# Patient Record
Sex: MALE | Race: WHITE | NOT HISPANIC OR LATINO | URBAN - METROPOLITAN AREA
[De-identification: names, ages, dates, MRNs, and addresses within clinical notes are randomized per-mention and may not be internally consistent; named-entity substitution may affect disease eponyms.]

---

## 2021-02-07 ENCOUNTER — INPATIENT (INPATIENT)
Facility: HOSPITAL | Age: 81
LOS: 1 days | Discharge: HOME | End: 2021-02-09
Attending: INTERNAL MEDICINE | Admitting: INTERNAL MEDICINE
Payer: MEDICARE

## 2021-02-07 VITALS
TEMPERATURE: 98 F | OXYGEN SATURATION: 100 % | HEART RATE: 59 BPM | RESPIRATION RATE: 18 BRPM | DIASTOLIC BLOOD PRESSURE: 64 MMHG | SYSTOLIC BLOOD PRESSURE: 141 MMHG

## 2021-02-07 DIAGNOSIS — Z98.890 OTHER SPECIFIED POSTPROCEDURAL STATES: Chronic | ICD-10-CM

## 2021-02-07 LAB
ALBUMIN SERPL ELPH-MCNC: 4 G/DL — SIGNIFICANT CHANGE UP (ref 3.5–5.2)
ALP SERPL-CCNC: 41 U/L — SIGNIFICANT CHANGE UP (ref 30–115)
ALT FLD-CCNC: 11 U/L — SIGNIFICANT CHANGE UP (ref 0–41)
ANION GAP SERPL CALC-SCNC: 17 MMOL/L — HIGH (ref 7–14)
AST SERPL-CCNC: 24 U/L — SIGNIFICANT CHANGE UP (ref 0–41)
BASOPHILS # BLD AUTO: 0.09 K/UL — SIGNIFICANT CHANGE UP (ref 0–0.2)
BASOPHILS NFR BLD AUTO: 0.8 % — SIGNIFICANT CHANGE UP (ref 0–1)
BILIRUB SERPL-MCNC: 0.2 MG/DL — SIGNIFICANT CHANGE UP (ref 0.2–1.2)
BUN SERPL-MCNC: 20 MG/DL — SIGNIFICANT CHANGE UP (ref 10–20)
CALCIUM SERPL-MCNC: 9.3 MG/DL — SIGNIFICANT CHANGE UP (ref 8.5–10.1)
CHLORIDE SERPL-SCNC: 93 MMOL/L — LOW (ref 98–110)
CO2 SERPL-SCNC: 15 MMOL/L — LOW (ref 17–32)
CREAT SERPL-MCNC: 1.5 MG/DL — SIGNIFICANT CHANGE UP (ref 0.7–1.5)
EOSINOPHIL # BLD AUTO: 0.13 K/UL — SIGNIFICANT CHANGE UP (ref 0–0.7)
EOSINOPHIL NFR BLD AUTO: 1.2 % — SIGNIFICANT CHANGE UP (ref 0–8)
ETHANOL SERPL-MCNC: <10 MG/DL — SIGNIFICANT CHANGE UP
GLUCOSE SERPL-MCNC: 110 MG/DL — HIGH (ref 70–99)
HCT VFR BLD CALC: 33.6 % — LOW (ref 42–52)
HGB BLD-MCNC: 11.5 G/DL — LOW (ref 14–18)
IMM GRANULOCYTES NFR BLD AUTO: 2.5 % — HIGH (ref 0.1–0.3)
LYMPHOCYTES # BLD AUTO: 1.93 K/UL — SIGNIFICANT CHANGE UP (ref 1.2–3.4)
LYMPHOCYTES # BLD AUTO: 17.6 % — LOW (ref 20.5–51.1)
MAGNESIUM SERPL-MCNC: 1.9 MG/DL — SIGNIFICANT CHANGE UP (ref 1.8–2.4)
MCHC RBC-ENTMCNC: 30.7 PG — SIGNIFICANT CHANGE UP (ref 27–31)
MCHC RBC-ENTMCNC: 34.2 G/DL — SIGNIFICANT CHANGE UP (ref 32–37)
MCV RBC AUTO: 89.8 FL — SIGNIFICANT CHANGE UP (ref 80–94)
MONOCYTES # BLD AUTO: 0.89 K/UL — HIGH (ref 0.1–0.6)
MONOCYTES NFR BLD AUTO: 8.1 % — SIGNIFICANT CHANGE UP (ref 1.7–9.3)
NEUTROPHILS # BLD AUTO: 7.66 K/UL — HIGH (ref 1.4–6.5)
NEUTROPHILS NFR BLD AUTO: 69.8 % — SIGNIFICANT CHANGE UP (ref 42.2–75.2)
NRBC # BLD: 0 /100 WBCS — SIGNIFICANT CHANGE UP (ref 0–0)
NT-PROBNP SERPL-SCNC: 421 PG/ML — HIGH (ref 0–300)
PLATELET # BLD AUTO: 222 K/UL — SIGNIFICANT CHANGE UP (ref 130–400)
POTASSIUM SERPL-MCNC: 4.8 MMOL/L — SIGNIFICANT CHANGE UP (ref 3.5–5)
POTASSIUM SERPL-SCNC: 4.8 MMOL/L — SIGNIFICANT CHANGE UP (ref 3.5–5)
PROT SERPL-MCNC: 6.9 G/DL — SIGNIFICANT CHANGE UP (ref 6–8)
RAPID RVP RESULT: SIGNIFICANT CHANGE UP
RBC # BLD: 3.74 M/UL — LOW (ref 4.7–6.1)
RBC # FLD: 13.8 % — SIGNIFICANT CHANGE UP (ref 11.5–14.5)
SARS-COV-2 RNA SPEC QL NAA+PROBE: SIGNIFICANT CHANGE UP
SODIUM SERPL-SCNC: 125 MMOL/L — LOW (ref 135–146)
TROPONIN T SERPL-MCNC: <0.01 NG/ML — SIGNIFICANT CHANGE UP
WBC # BLD: 10.97 K/UL — HIGH (ref 4.8–10.8)
WBC # FLD AUTO: 10.97 K/UL — HIGH (ref 4.8–10.8)

## 2021-02-07 PROCEDURE — 99285 EMERGENCY DEPT VISIT HI MDM: CPT

## 2021-02-07 PROCEDURE — 93010 ELECTROCARDIOGRAM REPORT: CPT

## 2021-02-07 PROCEDURE — 71045 X-RAY EXAM CHEST 1 VIEW: CPT | Mod: 26

## 2021-02-07 RX ORDER — TAMSULOSIN HYDROCHLORIDE 0.4 MG/1
0.8 CAPSULE ORAL AT BEDTIME
Refills: 0 | Status: DISCONTINUED | OUTPATIENT
Start: 2021-02-07 | End: 2021-02-09

## 2021-02-07 RX ORDER — CHLORHEXIDINE GLUCONATE 213 G/1000ML
1 SOLUTION TOPICAL
Refills: 0 | Status: DISCONTINUED | OUTPATIENT
Start: 2021-02-07 | End: 2021-02-09

## 2021-02-07 RX ORDER — ASPIRIN/CALCIUM CARB/MAGNESIUM 324 MG
81 TABLET ORAL
Refills: 0 | Status: DISCONTINUED | OUTPATIENT
Start: 2021-02-07 | End: 2021-02-09

## 2021-02-07 RX ORDER — ASPIRIN/CALCIUM CARB/MAGNESIUM 324 MG
1 TABLET ORAL
Qty: 0 | Refills: 0 | DISCHARGE

## 2021-02-07 RX ORDER — TAMSULOSIN HYDROCHLORIDE 0.4 MG/1
2 CAPSULE ORAL
Qty: 0 | Refills: 0 | DISCHARGE

## 2021-02-07 RX ORDER — FINASTERIDE 5 MG/1
1 TABLET, FILM COATED ORAL
Qty: 0 | Refills: 0 | DISCHARGE

## 2021-02-07 RX ORDER — HEPARIN SODIUM 5000 [USP'U]/ML
5000 INJECTION INTRAVENOUS; SUBCUTANEOUS EVERY 8 HOURS
Refills: 0 | Status: DISCONTINUED | OUTPATIENT
Start: 2021-02-07 | End: 2021-02-09

## 2021-02-07 NOTE — H&P ADULT - ASSESSMENT
80 yold male to ED Luxembourger speaking Pmhx BPH s/p prostate sx, HTN,  HLD, (Patient describes Endarterectomy procedure on left), depression s/p near syncopal episode witnessed by family; pt at dinner with family admits to feeling lightheaded and gradually laid to floor; pt has 2nd episode few minutes later upon  ambulation. Patient  states that he did not experience  any  room spinning only felt lightheaded at the  onset of the episode.  Patient  denies chest pain, sob,n/v/headache, sob, fever, chills cough; pt denies hx irreg hr in past; admits to mild alcohol use tonight;     Please Confirm rest  of medications in The AM: Apologize for the inconvenience   Family could not  remember all of patient medication  and Patient was also unable to remember  Vytorin  and Divalproex      Pharmacy: [facility] [address] [place] [address] Phone [phone]     Partial  Med Rec done       IMPRESSION  Presyncopal Episodes Likely Cardiogenic  Less likely  Autonomic dysfunction/ Peripheral Vertigo/Seizure/  Possible DOMENICO consult Progression on CKD Unknown  Baseline   HAGMA   Hyponatremia   Microcytic  Anemia   HO Endarterectomy? On left   HO HTN   HO BPH s/p Prostate Surg TURP?        Plan  Presyncopal Episodes Likely Cardiogenic  Less likely  Autonomic dysfunction/ Peripheral Vertigo   Tele monitoring  f/u Echo  f/u B12/folate/TSH  F/u Carotid Doppler   orthostatics  BP check    ECG  showing sinus  cindy  that improved  1st degree AV Block   holding BB     Possible DOMENICO consult Progression on CKD Unknown  Baseline   HAGMA   Hyponatremia? 125  - f/u urine lytes osmo, serum osmo  - monitor BMP   - Consider renal  US  if worsening  given Hx     HO Endarterectomy? On left  - f/u  carotid doppler   -  continue  Aspirin 81 BID     HO HTN   - monitor BP   - confirm home medications    HO BPH s/p Prostate Surg TURP?  - patient  takes  finasteride  and flowmax  - wears  adult diapers       Electrolyte Imbalances: Correct as needed  []  Hyponatremia   /   Hypernatremia  []   []  Hypokalemia   /   Hyperkalemia  []   []  Hypochlorhydria   /    Hypochlorhydria  []   []  Hypomagnesemia   /   Hypermagnesemia  []   []  Hypophosphatemia   /   Hyperphosphatemia  []       GI ppx:                                   [] Not indicated   /   [] Pantoprazole 40mg PO Daily    DVT ppx:  [] Not indicated / [x] Heparin 5000mg SubQ / [] Lovenox 40mg SubQ / [] SCDs    Fluids:   [x] PO  |  [] IVF    Activity:  [X] Assisatnce needed   [X] Increase as Tolerated  /  [] OOB w/ assist  /  [] Bed Rest    BMI:      DISPO:  Patient to be discharged when condition(s) optimized.  [x ] From Home     [ ] NH/SNF   [ ] 4A Rehab  [ ] Detox Clinic  [X] Plan Discussion with patient and/or family.  [X] Discussed Case and Plan with the Medical Attending.    CODE STATUS  [X] FULL   /    [] DNR     80 yold male to ED Kyrgyz speaking Pmhx BPH s/p prostate sx, HTN,  HLD, (Patient describes Endarterectomy procedure on left), depression s/p near syncopal episode witnessed by family; pt at dinner with family admits to feeling lightheaded and gradually laid to floor; pt has 2nd episode few minutes later upon  ambulation. Patient  states that he did not experience  any  room spinning only felt lightheaded at the  onset of the episode.  Patient  denies chest pain, sob,n/v/headache, sob, fever, chills cough; pt denies hx irreg hr in past; admits to mild alcohol use tonight;     Elevated  WBC Chest X-Ray  showing right lobe opacity no prior films     Please Confirm rest  of medications in The AM: Apologize for the inconvenience   Family could not  remember all of patient medication  and Patient was also unable to remember  Vytorin  and Divalproex      Pharmacy: [facility] [address] [place] [address] Phone [phone]     Partial  Med Rec done           IMPRESSION  Presyncopal Episodes Likely Cardiogenic  Less likely  Autonomic dysfunction/ Peripheral Vertigo/Seizure/  Possible DOMENICO consult Progression on CKD Unknown  Baseline   HAGMA   Hyponatremia   Microcytic  Anemia   HO Endarterectomy? On left   HO HTN   HO BPH s/p Prostate Surg TURP?        Plan  Presyncopal Episodes Likely Cardiogenic  Less likely  Autonomic dysfunction/ Peripheral Vertigo   Tele monitoring  f/u Echo  f/u B12/folate/TSH  F/u Carotid Doppler   orthostatics  BP check    ECG  showing sinus  cindy  that improved  1st degree AV Block   holding BB     Possible DOMENICO consult Progression on CKD Unknown  Baseline   HAGMA   Hyponatremia? 125  - f/u urine lytes osmo, serum osmo  - monitor BMP   - Consider renal  US  if worsening  given Hx     HO Endarterectomy? On left  - f/u  carotid doppler   -  continue  Aspirin 81 BID     HO HTN   - monitor BP   - confirm home medications    HO BPH s/p Prostate Surg TURP?  - patient  takes  finasteride  and flowmax  - wears  adult diapers       Electrolyte Imbalances: Correct as needed  []  Hyponatremia   /   Hypernatremia  []   []  Hypokalemia   /   Hyperkalemia  []   []  Hypochlorhydria   /    Hypochlorhydria  []   []  Hypomagnesemia   /   Hypermagnesemia  []   []  Hypophosphatemia   /   Hyperphosphatemia  []       GI ppx:                                   [] Not indicated   /   [] Pantoprazole 40mg PO Daily    DVT ppx:  [] Not indicated / [x] Heparin 5000mg SubQ / [] Lovenox 40mg SubQ / [] SCDs    Fluids:   [x] PO  |  [] IVF    Activity:  [X] Assisatnce needed   [X] Increase as Tolerated  /  [] OOB w/ assist  /  [] Bed Rest    BMI:      DISPO:  Patient to be discharged when condition(s) optimized.  [x ] From Home     [ ] NH/SNF   [ ] 4A Rehab  [ ] Detox Clinic  [X] Plan Discussion with patient and/or family.  [X] Discussed Case and Plan with the Medical Attending.    CODE STATUS  [X] FULL   /    [] DNR

## 2021-02-07 NOTE — H&P ADULT - HISTORY OF PRESENT ILLNESS
Guatemalan  [id number]  80 yold male to ED Singaporean speaking Pmhx BPH s/p prostate sx, HTN,  HLD, (Patient describes Endarterectomy procedure on left), depression s/p near syncopal episode witnessed by family; pt at dinner with family admits to feeling lightheaded and gradually laid to floor; pt has 2nd episode few minutes later upon  ambulation. Patient  states that he did not experience  any  room spinning only felt lightheaded at the  onset of the episode.  Patient  denies chest pain, sob,n/v/headache, sob, fever, chills cough; pt denies hx irreg hr in past; admits to mild alcohol use tonight;     Please Confirm rest  of medications in The AM Apologize for the inconvenience   Family could not  remember all of patient medication  and Patient was also unable to remember  Vytorin  and Divalproex      Pharmacy: [facility] [address] [place] [address] Phone [phone]     Partial  Med Rec done

## 2021-02-07 NOTE — ED PROVIDER NOTE - NS ED ROS FT
Constitutional: No fever, chills.  Eyes: No visual changes.  ENT: No hearing changes.  Neck: No neck pain or stiffness.  Cardiovascular: No chest pain, palpitations, edema.  Pulmonary: No SOB, cough. No hemoptysis.  Abdominal:  No nausea, vomiting, diarrhea.  : No dysuria, frequency.  Neuro: + near syncope x 2  MS: No back pain. No calf pain/swelling.  Psych: No suicidal ideations.

## 2021-02-07 NOTE — ED PROVIDER NOTE - PROGRESS NOTE DETAILS
pt initially placed in obs but will need further eval by renal, judicious fluid management for hyponatremia/pulmonary congestion; will admit to tele.

## 2021-02-07 NOTE — ED PROVIDER NOTE - OBJECTIVE STATEMENT
80 yold male to ED Congolese speaking Dr. Duckworth intrep Pmhx BPH s/p prostate sx, Htn, HLD, depression s/p near syncopal episode witnessed by family; pt at dinner with family admits to feeling lightheaded and gradually laid to floor; pt has 2nd episode few minutes later upon being ambulated; pt denies chest pain, sob,n/v/headache, sob, fever, chills cough; pt denies hx irreg hr in past; admits to mild alcohol use tonight;

## 2021-02-07 NOTE — H&P ADULT - NSHPREVIEWOFSYSTEMS_GEN_ALL_CORE
CONSTITUTIONAL: No fever, weight loss, or fatigue  EYES: No eye pain, visual disturbances, or discharge  ENMT:  No difficulty hearing, tinnitus, vertigo; No sinus or throat pain  RESPIRATORY: No cough, wheezing, chills or hemoptysis; No shortness of breath  CARDIOVASCULAR: No chest pain, palpitations, dizziness, or leg swelling  GASTROINTESTINAL: No abdominal or epigastric pain. No nausea, vomiting, or hematemesis; No diarrhea or constipation. No melena or hematochezia.  GENITOURINARY: No dysuria, frequency, hematuria, or incontinence  NEUROLOGICAL: No headaches, memory loss, loss of strength, numbness, or tremors  SKIN: No itching, burning, rashes, or lesions   LYMPH NODES: No enlarged glands  ENDOCRINE: No heat or cold intolerance; No hair loss

## 2021-02-07 NOTE — H&P ADULT - NSHPLABSRESULTS_GEN_ALL_CORE
.  LABS:                         11.5   10.97 )-----------( 222      ( 07 Feb 2021 19:55 )             33.6     02-07    125<L>  |  93<L>  |  20  ----------------------------<  110<H>  4.8   |  15<L>  |  1.5    Ca    9.3      07 Feb 2021 19:55  Mg     1.9     02-07    TPro  6.9  /  Alb  4.0  /  TBili  0.2  /  DBili  x   /  AST  24  /  ALT  11  /  AlkPhos  41  02-07        Serum Pro-Brain Natriuretic Peptide: 421 pg/mL (02-07 @ 19:55)    CARDIAC MARKERS ( 07 Feb 2021 19:55 )  x     / <0.01 ng/mL / x     / x     / x        ECG showing Sinus Adebayo Cardia         RADIOLOGY, EKG & ADDITIONAL TESTS: Reviewed.

## 2021-02-07 NOTE — ED PROVIDER NOTE - PHYSICAL EXAMINATION
Constitutional: Well developed, well nourished. NAD  Head: Normocephalic, atraumatic.  Eyes: PERRL, EOMI.  ENT: No nasal discharge. Mucous membranes dry.  Neck: Supple. Painless ROM.  Cardiovascular: Normal S1, S2. Regular rate and rhythm.    Pulmonary:  Lungs clear to auscultation bilaterally.    Abdominal: Soft. Nondistended. No rebound, guarding, rigidity.  Extremities. Pelvis stable. No lower extremity edema, symmetric calves.  Skin: No rashes, cyanosis.  Neuro: AAOx3. No focal neurological deficits.  Psych: Normal mood. Normal affect.

## 2021-02-07 NOTE — H&P ADULT - NSHPPHYSICALEXAM_GEN_ALL_CORE
General: No acute distress.  Alert, oriented, interactive, nonfocal.  Male     HEENT: Pupils equal, reactive to light symmetrically.    CHEST: gynecomastia      PULM: Clear to auscultation bilaterally, no significant sputum production.    CVS: Regular rate and rhythm, no murmurs, rubs, or gallops.    GI: Soft, nondistended, nontender, no masses.    EXT: No edema, nontender.    SKIN: Warm and well perfused, no rashes noted.    PSYCH: AAOx3    NEURO: Nonfocal

## 2021-02-07 NOTE — H&P ADULT - ATTENDING COMMENTS
HPI:  Cuban  [id number]  80 yold male to ED Marshallese speaking Pmhx BPH s/p prostate sx, HTN,  HLD, (Patient describes Endarterectomy procedure on left), depression s/p near syncopal episode witnessed by family; pt at dinner with family admits to feeling lightheaded and gradually laid to floor; pt has 2nd episode few minutes later upon  ambulation. Patient  states that he did not experience  any  room spinning only felt lightheaded at the  onset of the episode.  Patient  denies chest pain, sob,n/v/headache, sob, fever, chills cough; pt denies hx irreg hr in past; admits to mild alcohol use tonight;     Please Confirm rest  of medications in The AM Apologize for the inconvenience   Family could not  remember all of patient medication  and Patient was also unable to remember  Vytorin  and Divalproex      Pharmacy: [facility] [address] [place] [address] Phone [phone]     Partial  Med Rec done  (07 Feb 2021 23:26)    REVIEW OF SYSTEMS: see cc/HPI  CONSTITUTIONAL: No weakness, fevers or chills  EYES/ENT: No visual changes;  No vertigo or throat pain   NECK: No pain or stiffness  RESPIRATORY: No cough, wheezing, hemoptysis; No shortness of breath  CARDIOVASCULAR: No chest pain or palpitations  GASTROINTESTINAL: No abdominal or epigastric pain. No nausea, vomiting, or hematemesis; No diarrhea or constipation. No melena or hematochezia.  GENITOURINARY: No dysuria, frequency or hematuria  NEUROLOGICAL: No numbness or weakness  SKIN: No itching, rashes    Physical Exam:  General: WN/WD NAD  Neurology: A&Ox3, nonfocal, follows commands  Eyes: PERRLA/ EOMI  ENT/Neck: Neck supple, trachea midline, No JVD  Respiratory: CTA B/L, No wheezing, rales, rhonchi  CV: Normal rate regular rhythm, S1S2, no murmurs, rubs or gallops  Abdominal: Soft, NT, ND +BS,   Extremities: No edema, + peripheral pulses  Skin: No Rashes, Hematoma, Ecchymosis  Incisions: n/a  Tubes: n/a HPI:  Bermudian  [id number]  80 yold male to ED Andorran speaking Pmhx BPH s/p prostate sx, HTN,  HLD, (Patient describes Endarterectomy procedure on left), depression s/p near syncopal episode witnessed by family; pt at dinner with family admits to feeling lightheaded and gradually laid to floor; pt has 2nd episode few minutes later upon  ambulation. Patient  states that he did not experience  any  room spinning only felt lightheaded at the  onset of the episode.  Patient  denies chest pain, sob,n/v/headache, sob, fever, chills cough; pt denies hx irreg hr in past; admits to mild alcohol use tonight;     Please Confirm rest  of medications in The AM Apologize for the inconvenience   Family could not  remember all of patient medication  and Patient was also unable to remember  Vytorin  and Divalproex      Pharmacy: [facility] [address] [place] [address] Phone [phone]     Partial  Med Rec done  (07 Feb 2021 23:26)    REVIEW OF SYSTEMS: see cc/HPI  CONSTITUTIONAL: No weakness, fevers or chills  EYES/ENT: No visual changes;  No vertigo or throat pain   NECK: No pain or stiffness  RESPIRATORY: No cough, wheezing, hemoptysis; No shortness of breath  CARDIOVASCULAR: No chest pain or palpitations  GASTROINTESTINAL: No abdominal or epigastric pain. No nausea, vomiting, or hematemesis; No diarrhea or constipation. No melena or hematochezia.  GENITOURINARY: No dysuria, frequency or hematuria  NEUROLOGICAL: No numbness or weakness  SKIN: No itching, rashes    Physical Exam:  General: WN/WD NAD  Neurology: A&Ox3, nonfocal, follows commands  Eyes: PERRLA/ EOMI  ENT/Neck: Neck supple, trachea midline, No JVD  Respiratory: CTA B/L, No wheezing, rales, rhonchi  CV: Normal rate regular rhythm, S1S2, no murmurs, rubs or gallops  Abdominal: Soft, NT, ND +BS,   Extremities: No edema, + peripheral pulses  Skin: No Rashes, Hematoma, Ecchymosis  Incisions: n/a  Tubes: n/a    A/p  Syncope r/o arrhythmia r/o orthostatic hypotension r/o seizure   -admit to tele   -serial EKGs and Ag   -2D echo   -check TSH   -check orthostatic vitals     HAGMA ?? etiology   Hyponatremia r/o SIADH doubt dehydration ( no h/o decreased oral intake/appetite)  -IV fluids   -check ABG in AM  -Urine lytes/osmolarity     HTN -stable     H/o BPH s/pTURP HPI:  Malawian  [id number]  80 yold male to ED Egyptian speaking Pmhx BPH s/p prostate sx, HTN,  HLD, (Patient describes Endarterectomy procedure on left), depression s/p near syncopal episode witnessed by family; pt at dinner with family admits to feeling lightheaded and gradually laid to floor; pt has 2nd episode few minutes later upon  ambulation. Patient  states that he did not experience  any  room spinning only felt lightheaded at the  onset of the episode.  Patient  denies chest pain, sob,n/v/headache, sob, fever, chills cough; pt denies hx irreg hr in past; admits to mild alcohol use tonight;     Please Confirm rest  of medications in The AM Apologize for the inconvenience   Family could not  remember all of patient medication  and Patient was also unable to remember  Vytorin  and Divalproex      Pharmacy: [facility] [address] [place] [address] Phone [phone]     Partial  Med Rec done  (07 Feb 2021 23:26)    REVIEW OF SYSTEMS: see cc/HPI  CONSTITUTIONAL: No weakness, fevers or chills  EYES/ENT: No visual changes;  No vertigo or throat pain   NECK: No pain or stiffness  RESPIRATORY: No cough, wheezing, hemoptysis; No shortness of breath  CARDIOVASCULAR: No chest pain or palpitations  GASTROINTESTINAL: No abdominal or epigastric pain. No nausea, vomiting, or hematemesis; No diarrhea or constipation. No melena or hematochezia.  GENITOURINARY: No dysuria, frequency or hematuria  NEUROLOGICAL: No numbness or weakness  SKIN: No itching, rashes    Physical Exam:  General: WN/WD NAD  Neurology: A&Ox3, nonfocal, follows commands  Eyes: PERRLA/ EOMI  ENT/Neck: Neck supple, trachea midline, No JVD  Respiratory: CTA B/L, No wheezing, rales, rhonchi  CV: Normal rate regular rhythm, S1S2, no murmurs, rubs or gallops  Abdominal: Soft, NT, ND +BS,   Extremities: No edema, + peripheral pulses  Skin: No Rashes, Hematoma, Ecchymosis  Incisions: n/a  Tubes: n/a    A/p  Syncope r/o arrhythmia r/o orthostatic hypotension r/o seizure   -admit to tele   -serial EKGs and Ag   -2D echo   -check TSH   -check orthostatic vitals     HAGMA ?? etiology   Hyponatremia r/o SIADH doubt dehydration ( no h/o decreased oral intake/appetite)  -IV fluids   -check ABG in AM  -Urine lytes/osmolarity     HTN -stable     H/o BPH s/pTURP    DVT prophylaxis     Fall precautions

## 2021-02-07 NOTE — ED ADULT TRIAGE NOTE - CHIEF COMPLAINT QUOTE
PT BIBEMS from home for syncopal episode during dinner. Upon arrival EMS witnessed another syncopal episode. Per EMS, pt has had previous syconcipal episodes. No CP, SOB, fever, cough, or COVID contacts. HX of AIFB

## 2021-02-07 NOTE — ED PROVIDER NOTE - CARE PLAN
Principal Discharge DX:	Near syncope   Principal Discharge DX:	Near syncope  Secondary Diagnosis:	Hyponatremia

## 2021-02-07 NOTE — ED PROVIDER NOTE - CLINICAL SUMMARY MEDICAL DECISION MAKING FREE TEXT BOX
Pt presented to ED for near syncope x2. LAbs, imaging obtained. Pt also hyponatremic. Will admit to tele for further eval.

## 2021-02-07 NOTE — ED PROVIDER NOTE - ATTENDING CONTRIBUTION TO CARE
I personally evaluated the patient. I reviewed the Resident’s or Physician Assistant’s note (as assigned above), and agree with the findings and plan except as documented in my note.    Pt is an 81 y/o male with hx of HTN, DLD, BPH, presents to ED for syncope x2 while at rest at home. Witnessed by family, lasted 20-30 seconds, then pt back to baseline. No seizure activity. Pt denied chest pain, SOB, headache, vomiting. Endorsed nausea during episode. Currently asymptomatic.    Constitutional: Well developed, well nourished. NAD.  Head: Normocephalic, atraumatic.  Eyes: PERRL. EOMI.  ENT: No nasal discharge. Mucous membranes moist.  Neck: Supple. Painless ROM.  Cardiovascular: Normal S1, S2. Regular rate and rhythm. No murmurs, rubs, or gallops.  Pulmonary: Normal respiratory rate and effort. Lungs clear to auscultation bilaterally. No wheezing, rales, or rhonchi.  Abdominal: Soft. Nondistended. Nontender. No rebound, guarding, rigidity.  Extremities. Pelvis stable. No lower extremity edema, symmetric calves.  Skin: No rashes, cyanosis.  Neuro: AAOx3. No focal neurological deficits.  Psych: Normal mood. Normal affect.

## 2021-02-08 LAB
ALBUMIN SERPL ELPH-MCNC: 3.6 G/DL — SIGNIFICANT CHANGE UP (ref 3.5–5.2)
ALP SERPL-CCNC: 40 U/L — SIGNIFICANT CHANGE UP (ref 30–115)
ALT FLD-CCNC: 10 U/L — SIGNIFICANT CHANGE UP (ref 0–41)
ANION GAP SERPL CALC-SCNC: 11 MMOL/L — SIGNIFICANT CHANGE UP (ref 7–14)
AST SERPL-CCNC: 16 U/L — SIGNIFICANT CHANGE UP (ref 0–41)
BASOPHILS # BLD AUTO: 0.08 K/UL — SIGNIFICANT CHANGE UP (ref 0–0.2)
BASOPHILS NFR BLD AUTO: 0.8 % — SIGNIFICANT CHANGE UP (ref 0–1)
BILIRUB SERPL-MCNC: 0.3 MG/DL — SIGNIFICANT CHANGE UP (ref 0.2–1.2)
BUN SERPL-MCNC: 19 MG/DL — SIGNIFICANT CHANGE UP (ref 10–20)
CALCIUM SERPL-MCNC: 9.3 MG/DL — SIGNIFICANT CHANGE UP (ref 8.5–10.1)
CHLORIDE SERPL-SCNC: 101 MMOL/L — SIGNIFICANT CHANGE UP (ref 98–110)
CO2 SERPL-SCNC: 23 MMOL/L — SIGNIFICANT CHANGE UP (ref 17–32)
CREAT SERPL-MCNC: 1.5 MG/DL — SIGNIFICANT CHANGE UP (ref 0.7–1.5)
EOSINOPHIL # BLD AUTO: 0.19 K/UL — SIGNIFICANT CHANGE UP (ref 0–0.7)
EOSINOPHIL NFR BLD AUTO: 1.9 % — SIGNIFICANT CHANGE UP (ref 0–8)
FOLATE SERPL-MCNC: 17.4 NG/ML — SIGNIFICANT CHANGE UP
GLUCOSE SERPL-MCNC: 118 MG/DL — HIGH (ref 70–99)
HCT VFR BLD CALC: 33.7 % — LOW (ref 42–52)
HGB BLD-MCNC: 11 G/DL — LOW (ref 14–18)
IMM GRANULOCYTES NFR BLD AUTO: 1.9 % — HIGH (ref 0.1–0.3)
LYMPHOCYTES # BLD AUTO: 1.89 K/UL — SIGNIFICANT CHANGE UP (ref 1.2–3.4)
LYMPHOCYTES # BLD AUTO: 19.3 % — LOW (ref 20.5–51.1)
MAGNESIUM SERPL-MCNC: 2 MG/DL — SIGNIFICANT CHANGE UP (ref 1.8–2.4)
MCHC RBC-ENTMCNC: 29.7 PG — SIGNIFICANT CHANGE UP (ref 27–31)
MCHC RBC-ENTMCNC: 32.6 G/DL — SIGNIFICANT CHANGE UP (ref 32–37)
MCV RBC AUTO: 91.1 FL — SIGNIFICANT CHANGE UP (ref 80–94)
MONOCYTES # BLD AUTO: 1.21 K/UL — HIGH (ref 0.1–0.6)
MONOCYTES NFR BLD AUTO: 12.4 % — HIGH (ref 1.7–9.3)
NEUTROPHILS # BLD AUTO: 6.22 K/UL — SIGNIFICANT CHANGE UP (ref 1.4–6.5)
NEUTROPHILS NFR BLD AUTO: 63.7 % — SIGNIFICANT CHANGE UP (ref 42.2–75.2)
NRBC # BLD: 0 /100 WBCS — SIGNIFICANT CHANGE UP (ref 0–0)
OSMOLALITY SERPL: 308 MOS/KG — HIGH (ref 280–301)
PLATELET # BLD AUTO: 230 K/UL — SIGNIFICANT CHANGE UP (ref 130–400)
POTASSIUM SERPL-MCNC: 4.7 MMOL/L — SIGNIFICANT CHANGE UP (ref 3.5–5)
POTASSIUM SERPL-SCNC: 4.7 MMOL/L — SIGNIFICANT CHANGE UP (ref 3.5–5)
PROT SERPL-MCNC: 6.4 G/DL — SIGNIFICANT CHANGE UP (ref 6–8)
RBC # BLD: 3.7 M/UL — LOW (ref 4.7–6.1)
RBC # FLD: 14 % — SIGNIFICANT CHANGE UP (ref 11.5–14.5)
SODIUM SERPL-SCNC: 135 MMOL/L — SIGNIFICANT CHANGE UP (ref 135–146)
TROPONIN T SERPL-MCNC: <0.01 NG/ML — SIGNIFICANT CHANGE UP
TSH SERPL-MCNC: 3.12 UIU/ML — SIGNIFICANT CHANGE UP (ref 0.27–4.2)
VIT B12 SERPL-MCNC: 427 PG/ML — SIGNIFICANT CHANGE UP (ref 232–1245)
WBC # BLD: 9.78 K/UL — SIGNIFICANT CHANGE UP (ref 4.8–10.8)
WBC # FLD AUTO: 9.78 K/UL — SIGNIFICANT CHANGE UP (ref 4.8–10.8)

## 2021-02-08 PROCEDURE — 93880 EXTRACRANIAL BILAT STUDY: CPT | Mod: 26

## 2021-02-08 PROCEDURE — 93306 TTE W/DOPPLER COMPLETE: CPT | Mod: 26

## 2021-02-08 PROCEDURE — 93010 ELECTROCARDIOGRAM REPORT: CPT

## 2021-02-08 PROCEDURE — 99223 1ST HOSP IP/OBS HIGH 75: CPT

## 2021-02-08 RX ORDER — EZETIMIBE 10 MG/1
1 TABLET ORAL
Qty: 0 | Refills: 0 | DISCHARGE

## 2021-02-08 RX ORDER — ICOSAPENT ETHYL 500 MG/1
1 CAPSULE, LIQUID FILLED ORAL
Qty: 0 | Refills: 0 | DISCHARGE

## 2021-02-08 RX ORDER — FENOFIBRATE,MICRONIZED 130 MG
1 CAPSULE ORAL
Qty: 0 | Refills: 0 | DISCHARGE

## 2021-02-08 RX ORDER — LOSARTAN POTASSIUM 100 MG/1
1 TABLET, FILM COATED ORAL
Qty: 0 | Refills: 0 | DISCHARGE

## 2021-02-08 RX ORDER — QUETIAPINE FUMARATE 200 MG/1
1 TABLET, FILM COATED ORAL
Qty: 0 | Refills: 0 | DISCHARGE

## 2021-02-08 RX ORDER — SIMVASTATIN 20 MG/1
1 TABLET, FILM COATED ORAL
Qty: 0 | Refills: 0 | DISCHARGE

## 2021-02-08 RX ORDER — CHOLECALCIFEROL (VITAMIN D3) 125 MCG
1 CAPSULE ORAL
Qty: 0 | Refills: 0 | DISCHARGE

## 2021-02-08 RX ADMIN — Medication 81 MILLIGRAM(S): at 05:55

## 2021-02-08 RX ADMIN — TAMSULOSIN HYDROCHLORIDE 0.8 MILLIGRAM(S): 0.4 CAPSULE ORAL at 22:06

## 2021-02-08 RX ADMIN — HEPARIN SODIUM 5000 UNIT(S): 5000 INJECTION INTRAVENOUS; SUBCUTANEOUS at 05:55

## 2021-02-08 RX ADMIN — Medication 81 MILLIGRAM(S): at 16:53

## 2021-02-08 NOTE — ED ADULT NURSE NOTE - OBJECTIVE STATEMENT
Pt was BIBA from home after 2episode of syncope, one afetr dinner last night, and the second pone after the EMT came, witnessed. Pt is alert and orientedx3, with occasional repetition of statements. Pt denies pains, No LOC, N/V. Covid negative.

## 2021-02-08 NOTE — PROGRESS NOTE ADULT - SUBJECTIVE AND OBJECTIVE BOX
CAITLIN KEMP 80y Male  MRN#: [id number]   CODE STATUS:________      SUBJECTIVE  Patient is a 80y old Male who presents with a chief complaint of Currently admitted to medicine with the primary diagnosis of Near syncope      Today is hospital day 1d, and this morning he is           OBJECTIVE  PAST MEDICAL & SURGICAL HISTORY  History of BPH    H/O endarterectomy    HTN (hypertension)    History of transurethral prostatectomy      ALLERGIES:  No Known Allergies    MEDICATIONS:  STANDING MEDICATIONS  aspirin enteric coated 81 milliGRAM(s) Oral two times a day  chlorhexidine 4% Liquid 1 Application(s) Topical <User Schedule>  heparin   Injectable 5000 Unit(s) SubCutaneous every 8 hours  tamsulosin 0.8 milliGRAM(s) Oral at bedtime    PRN MEDICATIONS      VITAL SIGNS: Last 24 Hours  T(C): 35.7 (08 Feb 2021 04:50), Max: 36.4 (07 Feb 2021 19:41)  T(F): 96.3 (08 Feb 2021 04:50), Max: 97.6 (07 Feb 2021 19:41)  HR: 63 (08 Feb 2021 01:17) (59 - 63)  BP: 138/62 (08 Feb 2021 01:17) (138/62 - 141/64)  BP(mean): --  RR: 19 (08 Feb 2021 01:17) (18 - 19)  SpO2: 100% (08 Feb 2021 01:17) (100% - 100%)    LABS:                        11.0   9.78  )-----------( 230      ( 08 Feb 2021 07:15 )             33.7     02-08    135  |  101  |  19  ----------------------------<  118<H>  4.7   |  23  |  1.5    Ca    9.3      08 Feb 2021 07:15  Mg     2.0     02-08    TPro  6.4  /  Alb  3.6  /  TBili  0.3  /  DBili  x   /  AST  16  /  ALT  10  /  AlkPhos  40  02-08          Troponin T, Serum: <0.01 ng/mL (02-08-21 @ 07:15)  Troponin T, Serum: <0.01 ng/mL (02-07-21 @ 19:55)      CARDIAC MARKERS ( 08 Feb 2021 07:15 )  x     / <0.01 ng/mL / x     / x     / x      CARDIAC MARKERS ( 07 Feb 2021 19:55 )  x     / <0.01 ng/mL / x     / x     / x          RADIOLOGY:      PHYSICAL EXAM:    GENERAL: NAD, well-developed, AAOx3  HEENT:  Atraumatic, Normocephalic. EOMI, PERRLA, conjunctiva and sclera clear, No JVD  PULMONARY: Clear to auscultation bilaterally; No wheeze  CARDIOVASCULAR: Regular rate and rhythm; No murmurs, rubs, or gallops  GASTROINTESTINAL: Soft, Nontender, Nondistended; Bowel sounds present  MUSCULOSKELETAL:  2+ Peripheral Pulses, No clubbing, cyanosis, or edema  NEUROLOGY: non-focal  SKIN: No rashes or lesions       CAITLIN KEMP 80y Male  MRN#: [id number]   CODE STATUS:________      SUBJECTIVE  HPI: "[place]  [id number]  80 yold male to ED Burmese speaking Pmhx BPH s/p prostate sx, HTN,  HLD, (Patient describes Endarterectomy procedure on left), depression s/p near syncopal episode witnessed by family; pt at dinner with family admits to feeling lightheaded and gradually laid to floor; pt has 2nd episode few minutes later upon  ambulation. Patient  states that he did not experience  any  room spinning only felt lightheaded at the  onset of the episode.  Patient  denies chest pain, sob,n/v/headache, sob, fever, chills cough; pt denies hx irreg hr in past; admits to mild alcohol use tonight"    In ED yesterday, WBC found to be elevated, right lobe opacities on CXR w/ no prior study. Patient was not seen at the bedside this morning as he had been sent down for carotid doppler around 7AM. Will assess when returns.    Home med list still to be obtained from pharmacy, attempted to reach this morning unsuccessfully, will continue trying. Pharmacy: [facility] [address] [place] [address] Phone [phone].           OBJECTIVE  PAST MEDICAL & SURGICAL HISTORY  History of BPH    H/O endarterectomy    HTN (hypertension)    History of transurethral prostatectomy      ALLERGIES:  No Known Allergies    MEDICATIONS:  STANDING MEDICATIONS  aspirin enteric coated 81 milliGRAM(s) Oral two times a day  chlorhexidine 4% Liquid 1 Application(s) Topical <User Schedule>  heparin   Injectable 5000 Unit(s) SubCutaneous every 8 hours  tamsulosin 0.8 milliGRAM(s) Oral at bedtime    PRN MEDICATIONS      VITAL SIGNS: Last 24 Hours  T(C): 35.7 (08 Feb 2021 04:50), Max: 36.4 (07 Feb 2021 19:41)  T(F): 96.3 (08 Feb 2021 04:50), Max: 97.6 (07 Feb 2021 19:41)  HR: 63 (08 Feb 2021 01:17) (59 - 63)  BP: 138/62 (08 Feb 2021 01:17) (138/62 - 141/64)  BP(mean): --  RR: 19 (08 Feb 2021 01:17) (18 - 19)  SpO2: 100% (08 Feb 2021 01:17) (100% - 100%)    LABS:                        11.0   9.78  )-----------( 230      ( 08 Feb 2021 07:15 )             33.7     02-08    135  |  101  |  19  ----------------------------<  118<H>  4.7   |  23  |  1.5    Ca    9.3      08 Feb 2021 07:15  Mg     2.0     02-08    TPro  6.4  /  Alb  3.6  /  TBili  0.3  /  DBili  x   /  AST  16  /  ALT  10  /  AlkPhos  40  02-08          Troponin T, Serum: <0.01 ng/mL (02-08-21 @ 07:15)  Troponin T, Serum: <0.01 ng/mL (02-07-21 @ 19:55)      CARDIAC MARKERS ( 08 Feb 2021 07:15 )  x     / <0.01 ng/mL / x     / x     / x      CARDIAC MARKERS ( 07 Feb 2021 19:55 )  x     / <0.01 ng/mL / x     / x     / x          RADIOLOGY:  CXR 2/7/21 21:06 : No evidence of acute cardiopulmonary disease    PHYSICAL EXAM:    Deferred until patient returns.       CAITLIN KEMP 80y Male  MRN#: [id number]   CODE STATUS:________      SUBJECTIVE  HPI: "[place]aguayan  [id number]  80 yold male to ED Swazi speaking Pmhx BPH s/p prostate sx, HTN,  HLD, (Patient describes Endarterectomy procedure on left), depression s/p near syncopal episode witnessed by family; pt at dinner with family admits to feeling lightheaded and gradually laid to floor; pt has 2nd episode few minutes later upon  ambulation. Patient  states that he did not experience  any  room spinning only felt lightheaded at the  onset of the episode.  Patient  denies chest pain, sob,n/v/headache, sob, fever, chills cough; pt denies hx irreg hr in past; admits to mild alcohol use tonight"    In ED yesterday, WBC found to be elevated, right lobe opacities on CXR w/ no prior study. Patient was not seen at the bedside this morning as he had been sent down for carotid doppler around 7AM. Will assess when returns.    Med list obtained from pharmacy this morning. Pharmacy: [facility] [address] [place] [address] Phone [phone].           OBJECTIVE  PAST MEDICAL & SURGICAL HISTORY  History of BPH    H/O endarterectomy    HTN (hypertension)    History of transurethral prostatectomy      ALLERGIES:  No Known Allergies    MEDICATIONS:  STANDING MEDICATIONS  aspirin enteric coated 81 milliGRAM(s) Oral two times a day  chlorhexidine 4% Liquid 1 Application(s) Topical <User Schedule>  heparin   Injectable 5000 Unit(s) SubCutaneous every 8 hours  tamsulosin 0.8 milliGRAM(s) Oral at bedtime    Along with above, As per pharmacy:  quetiapine 25mg po qd  sodium valproate 50mg po qd   Vit D 5000 units po qd  Vascepa 1g po qd  Fenofibrate 145mg po qd  losartan 20mg po qd  metoprolol 25mg po qd  simvastatin 20mg po qd  zetia 10mg po qd  furosemide 20mg po qd    PRN MEDICATIONS      VITAL SIGNS: Last 24 Hours  T(C): 35.7 (08 Feb 2021 04:50), Max: 36.4 (07 Feb 2021 19:41)  T(F): 96.3 (08 Feb 2021 04:50), Max: 97.6 (07 Feb 2021 19:41)  HR: 63 (08 Feb 2021 01:17) (59 - 63)  BP: 138/62 (08 Feb 2021 01:17) (138/62 - 141/64)  BP(mean): --  RR: 19 (08 Feb 2021 01:17) (18 - 19)  SpO2: 100% (08 Feb 2021 01:17) (100% - 100%)    LABS:                        11.0   9.78  )-----------( 230      ( 08 Feb 2021 07:15 )             33.7     02-08    135  |  101  |  19  ----------------------------<  118<H>  4.7   |  23  |  1.5    Ca    9.3      08 Feb 2021 07:15  Mg     2.0     02-08    TPro  6.4  /  Alb  3.6  /  TBili  0.3  /  DBili  x   /  AST  16  /  ALT  10  /  AlkPhos  40  02-08          Troponin T, Serum: <0.01 ng/mL (02-08-21 @ 07:15)  Troponin T, Serum: <0.01 ng/mL (02-07-21 @ 19:55)      CARDIAC MARKERS ( 08 Feb 2021 07:15 )  x     / <0.01 ng/mL / x     / x     / x      CARDIAC MARKERS ( 07 Feb 2021 19:55 )  x     / <0.01 ng/mL / x     / x     / x          RADIOLOGY:  CXR 2/7/21 21:06 : No evidence of acute cardiopulmonary disease    PHYSICAL EXAM:    Deferred until patient returns.       CAITLIN KEMP 80y Male  MRN#: [id number]   CODE STATUS:________      SUBJECTIVE  HPI: "[place]  [id number]  80 yold male to ED Honduran speaking Pmhx BPH s/p prostate sx, HTN,  HLD, (Patient describes Endarterectomy procedure on left), depression s/p near syncopal episode witnessed by family; pt at dinner with family admits to feeling lightheaded and gradually laid to floor; pt has 2nd episode few minutes later upon  ambulation. Patient  states that he did not experience  any  room spinning only felt lightheaded at the  onset of the episode.  Patient  denies chest pain, sob,n/v/headache, sob, fever, chills cough; pt denies hx irreg hr in past; admits to mild alcohol use tonight"    In ED yesterday, WBC found to be elevated, right lobe opacities on CXR w/ no prior study. Patient was not seen at the bedside this morning as he had been sent down for carotid doppler around 7AM. Will assess when returns.    Med list obtained from pharmacy this morning. Pharmacy: [facility] [address] [place] [address] Phone [phone].           OBJECTIVE  PAST MEDICAL & SURGICAL HISTORY  History of BPH    H/O endarterectomy    HTN (hypertension)    History of transurethral prostatectomy      ALLERGIES:  No Known Allergies    MEDICATIONS:  STANDING MEDICATIONS  aspirin enteric coated 81 milliGRAM(s) Oral two times a day  chlorhexidine 4% Liquid 1 Application(s) Topical <User Schedule>  heparin   Injectable 5000 Unit(s) SubCutaneous every 8 hours  tamsulosin 0.8 milliGRAM(s) Oral at bedtime    Along with above, As per pharmacy:  quetiapine 25mg po qd  sodium valproate 50mg po qd   Vit D 5000 units po qd  Vascepa 1g po qd  Fenofibrate 145mg po qd  losartan 20mg po qd  metoprolol 25mg po qd  simvastatin 20mg po qd  zetia 10mg po qd  furosemide 20mg po qd    PRN MEDICATIONS      VITAL SIGNS: Last 24 Hours  T(C): 35.7 (08 Feb 2021 04:50), Max: 36.4 (07 Feb 2021 19:41)  T(F): 96.3 (08 Feb 2021 04:50), Max: 97.6 (07 Feb 2021 19:41)  HR: 63 (08 Feb 2021 01:17) (59 - 63)  BP: 138/62 (08 Feb 2021 01:17) (138/62 - 141/64)  BP(mean): --  RR: 19 (08 Feb 2021 01:17) (18 - 19)  SpO2: 100% (08 Feb 2021 01:17) (100% - 100%)    LABS:                        11.0   9.78  )-----------( 230      ( 08 Feb 2021 07:15 )             33.7     02-08    135  |  101  |  19  ----------------------------<  118<H>  4.7   |  23  |  1.5    Ca    9.3      08 Feb 2021 07:15  Mg     2.0     02-08    TPro  6.4  /  Alb  3.6  /  TBili  0.3  /  DBili  x   /  AST  16  /  ALT  10  /  AlkPhos  40  02-08          Troponin T, Serum: <0.01 ng/mL (02-08-21 @ 07:15)  Troponin T, Serum: <0.01 ng/mL (02-07-21 @ 19:55)      CARDIAC MARKERS ( 08 Feb 2021 07:15 )  x     / <0.01 ng/mL / x     / x     / x      CARDIAC MARKERS ( 07 Feb 2021 19:55 )  x     / <0.01 ng/mL / x     / x     / x          RADIOLOGY:  CXR 2/7/21 21:06 : No evidence of acute cardiopulmonary disease    < from: VA Duplex Carotid, Bilat (02.08.21 @ 08:53) >  Impression:    Less than 50% stenosis of the right internal carotid artery.  Less than 50% stenosis of the left internal carotid artery.    < end of copied text >  < from: TTE Echo Complete w/o Contrast w/ Doppler (02.08.21 @ 09:52) >  Summary:   1. Left ventricular ejection fraction, by visual estimation, is 60 to 65%.   2. Normal left ventricular size and wall thicknesses, with normal systolic and diastolic function.   3. Mild aortic regurgitation.   4. Mild mitral regurgitation.   5. Mild tricuspid regurgitation.    < end of copied text >      PHYSICAL EXAM:    LOS: 1d    VITALS:   T(C): 36.2 (02-08-21 @ 13:39), Max: 36.4 (02-07-21 @ 19:41)  HR: 80 (02-08-21 @ 13:39) (59 - 80)  BP: 121/60 (02-08-21 @ 13:39) (121/60 - 141/64)  RR: 19 (02-08-21 @ 13:39) (18 - 19)  SpO2: 100% (02-08-21 @ 01:17) (100% - 100%)    GENERAL: NAD, lying in bed comfortably  HEAD:  Atraumatic, Normocephalic  EYES: EOMI, PERRLA, conjunctiva and sclera clear  ENT: Moist mucous membranes  NECK: Supple, No JVD  CHEST/LUNG: Clear to auscultation bilaterally; No rales, rhonchi, wheezing, or rubs. Unlabored respirations  HEART: Regular rate and rhythm; No murmurs, rubs, or gallops  ABDOMEN: BSx4; Soft, nontender, nondistended  EXTREMITIES:  2+ Peripheral Pulses, brisk capillary refill. No clubbing, cyanosis, or edema  NERVOUS SYSTEM:  A&Ox3, no focal deficits   SKIN: No rashes or lesions.

## 2021-02-08 NOTE — PROGRESS NOTE ADULT - ASSESSMENT
80 yold male to ED Nicaraguan speaking Pmhx BPH s/p prostate sx, HTN,  HLD, (Patient describes Endarterectomy procedure on left), depression s/p near syncopal episode witnessed by family; pt at dinner with family admits to feeling lightheaded and gradually laid to floor; pt has 2nd episode few minutes later upon  ambulation. Patient  states that he did not experience  any  room spinning only felt lightheaded at the  onset of the episode.  Patient  denies chest pain, sob,n/v/headache, sob, fever, chills cough; pt denies hx irreg hr in past; admits to mild alcohol use tonight;     Elevated  WBC Chest X-Ray  showing right lobe opacity no prior films     Please Confirm rest  of medications in The AM: Apologize for the inconvenience   Family could not  remember all of patient medication  and Patient was also unable to remember  Vytorin  and Divalproex      Pharmacy: [facility] [address] [place] [address] Phone [phone]     Partial  Med Rec done           IMPRESSION  Presyncopal Episodes Likely Cardiogenic  Less likely  Autonomic dysfunction/ Peripheral Vertigo/Seizure/  Possible DOMENICO consult Progression on CKD Unknown  Baseline   HAGMA   Hyponatremia   Microcytic  Anemia   HO Endarterectomy? On left   HO HTN   HO BPH s/p Prostate Surg TURP?        Plan  Presyncopal Episodes Likely Cardiogenic  Less likely  Autonomic dysfunction/ Peripheral Vertigo   Tele monitoring  f/u Echo  f/u B12/folate/TSH  F/u Carotid Doppler   orthostatics  BP check    ECG  showing sinus  cindy  that improved  1st degree AV Block   holding BB     Possible DOMENICO consult Progression on CKD Unknown  Baseline   HAGMA   Hyponatremia? 125  - f/u urine lytes osmo, serum osmo  - monitor BMP   - Consider renal  US  if worsening  given Hx     HO Endarterectomy? On left  - f/u  carotid doppler   -  continue  Aspirin 81 BID     HO HTN   - monitor BP   - confirm home medications    HO BPH s/p Prostate Surg TURP?  - patient  takes  finasteride  and flowmax  - wears  adult diapers       Electrolyte Imbalances: Correct as needed  []  Hyponatremia   /   Hypernatremia  []   []  Hypokalemia   /   Hyperkalemia  []   []  Hypochlorhydria   /    Hypochlorhydria  []   []  Hypomagnesemia   /   Hypermagnesemia  []   []  Hypophosphatemia   /   Hyperphosphatemia  []       GI ppx:                                   [] Not indicated   /   [] Pantoprazole 40mg PO Daily    DVT ppx:  [] Not indicated / [x] Heparin 5000mg SubQ / [] Lovenox 40mg SubQ / [] SCDs    Fluids:   [x] PO  |  [] IVF    Activity:  [X] Assisatnce needed   [X] Increase as Tolerated  /  [] OOB w/ assist  /  [] Bed Rest    BMI:      DISPO:  Patient to be discharged when condition(s) optimized.  [x ] From Home     [ ] NH/SNF   [ ] 4A Rehab  [ ] Detox Clinic  [X] Plan Discussion with patient and/or family.  [X] Discussed Case and Plan with the Medical Attending.    CODE STATUS  [X] FULL   /    [] DNR     80 yold male to ED Maldivian speaking Pmhx BPH s/p prostate sx, HTN,  HLD, (Patient describes Endarterectomy procedure on left), depression s/p near syncopal episode witnessed by family; pt at dinner with family admits to feeling lightheaded and gradually laid to floor; pt has 2nd episode few minutes later upon  ambulation. Patient  states that he did not experience  any  room spinning only felt lightheaded at the  onset of the episode.  Patient  denies chest pain, sob,n/v/headache, sob, fever, chills cough; pt denies hx irreg hr in past; admits to mild alcohol use tonight;     Elevated  WBC Chest X-Ray  showing right lobe opacity no prior films     Please Confirm rest  of medications in The AM: Apologize for the inconvenience   Family could not  remember all of patient medication  and Patient was also unable to remember  Vytorin  and Divalproex      Pharmacy: [facility] [address] [place] [address] Phone [phone]     Partial  Med Rec done           IMPRESSION  Presyncopal Episodes Likely Cardiogenic  Less likely  Autonomic dysfunction/ Peripheral Vertigo/Seizure/  Possible DOMENICO consult Progression on CKD Unknown  Baseline   HAGMA   Hyponatremia   Microcytic  Anemia   HO Endarterectomy? On left   HO HTN   HO BPH s/p Prostate Surg TURP?        Plan  Presyncopal Episodes Likely Cardiogenic  Less likely  Autonomic dysfunction/ Peripheral Vertigo   Tele monitoring  Echo, ef 60-65%  Carotid Doppler, less than 50% stenosis in b/l carotid artery  orthostatics  BP check, negative   ECG  showing sinus  cindy  that improved  1st degree AV Block   holding BB  f/u B12/folate/TSH    Possible DOMENICO consult Progression on CKD Unknown  Baseline   HAGMA   Hyponatremia? initially 125, now improving 135  - f/u urine lytes osmo, serum osmo  - monitor BMP     HO Endarterectomy? On left  - carotid doppler, done, less than 50% stenosis in b/l carotid artery  -  continue  Aspirin 81 BID     HO HTN   - monitor BP   - Take losartan 25mg daily and metoprolol 25mg daily at home    HO BPH s/p Prostate Surg TURP?  - patient  takes  finasteride  and flowmax  - wears  adult diapers       Electrolyte Imbalances: Correct as needed  []  Hyponatremia   /   Hypernatremia  []   []  Hypokalemia   /   Hyperkalemia  []   []  Hypochlorhydria   /    Hypochlorhydria  []   []  Hypomagnesemia   /   Hypermagnesemia  []   []  Hypophosphatemia   /   Hyperphosphatemia  []       GI ppx:                                   [] Not indicated   /   [] Pantoprazole 40mg PO Daily    DVT ppx:  [] Not indicated / [x] Heparin 5000mg SubQ / [] Lovenox 40mg SubQ / [] SCDs    Fluids:   [x] PO  |  [] IVF    Activity:  [X] Assisatnce needed   [X] Increase as Tolerated  /  [] OOB w/ assist  /  [] Bed Rest    BMI:      DISPO:  Patient to be discharged when condition(s) optimized.  [x ] From Home     [ ] NH/SNF   [ ] 4A Rehab  [ ] Detox Clinic  [X] Plan Discussion with patient and/or family.  [X] Discussed Case and Plan with the Medical Attending.    CODE STATUS  [X] FULL   /    [] DNR

## 2021-02-08 NOTE — ED ADULT NURSE NOTE - BREATH SOUNDS, MLM
Fiber Foods  It is recommended that you consume 25-45 grams daily.  Drink 4 bottles of water per day.    Fresh & Dried Fruit  Serving Size Fiber (g)    Apples with skin  1 medium 5.0    Apricot  3 medium 1.0    Apricots, dried  4 pieces 2.9    Banana  1 medium 3.9    Blueberries  1 cup 4.2    Cantaloupe, cubes  1 cup 1.3    Figs, dried  2 medium 3.7    Grapefruit  1/2 medium 3.1    Orange, navel  1 medium 3.4    Peach  1 medium 2.0    Peaches, dried  3 pieces 3.2    Pear  1 medium 5.1    Plum  1 medium 1.1    Raisins  1.5 oz box 1.6    Raspberries  1 cup 6.4    Strawberries  1 cup 4.4      Grains, Beans, Nuts & Seeds  Serving Size Fiber (g)    Almonds  1 oz 4.2    Black beans, cooked  1 cup 13.9    Bran cereal  1 cup 19.9    Bread, whole wheat  1 slice 2.0    Brown rice, dry  1 cup 7.9    Cashews  1 oz 1.0    Flax seeds  3 Tbsp. 6.9    Garbanzo beans, cooked  1 cup 5.8    Kidney beans, cooked  1 cup 11.6    Lentils, red cooked  1 cup 13.6    Lima beans, cooked  1 cup 8.6    Oats, rolled dry  1 cup 12.0    Quinoa (seeds) dry  1/4 cup 6.2    Quinoa, cooked  1 cup 8.4    Pasta, whole wheat  1 cup 6.3    Peanuts  1 oz 2.3    Pistachio nuts  1 oz 3.1    Pumpkin seeds  1/4 cup 4.1    Soybeans, cooked  1 cup 8.6    Sunflower seeds  1/4 cup 3.0    Walnuts  1 oz 3.1            Vegetables  Serving Size Fiber (g)    Avocado (fruit)  1 medium 11.8    Beets, cooked  1 cup 2.8    Beet greens  1 cup 4.2    Bok loli, cooked  1 cup 2.8    Broccoli, cooked  1 cup 4.5    Labelle sprouts, cooked  1 cup 3.6    Cabbage, cooked  1 cup 4.2    Carrot  1 medium 2.6    Carrot, cooked  1 cup 5.2    Cauliflower, cooked  1 cup 3.4    Royer slaw  1 cup 4.0    Riky greens, cooked  1 cup 2.6    Corn, sweet  1 cup 4.6    Green beans  1 cup 4.0    Celery  1 stalk 1.1    Kale, cooked  1 cup 7.2    Onions, raw  1 cup 2.9    Peas, cooked  1 cup 8.8    Peppers, sweet  1 cup 2.6    Pop corn, air-popped  3 cups 3.6    Potato, baked w/ skin  1 medium 4.8     Spinach, cooked  1 cup 4.3    Summer squash, cooked  1 cup 2.5    Sweet potato, cooked  1 medium 4.9    Swiss chard, cooked  1 cup 3.7    Tomato  1 medium 1.0    Winter squash, cooked  1 cup 6.2    Zucchini, cooked  1 cup 2.6          Clear

## 2021-02-08 NOTE — CHART NOTE - NSCHARTNOTEFT_GEN_A_CORE
Pt on his way to vascular lab when I went to see him.  Comfortable in bed  No complaints per RN  Continue workup for near syncope: telemetry/check orthostatics/ECHO  troponin negative x 2  Hyponatremia and HAGMA now resolved after hydration

## 2021-02-09 VITALS
SYSTOLIC BLOOD PRESSURE: 134 MMHG | DIASTOLIC BLOOD PRESSURE: 72 MMHG | TEMPERATURE: 96 F | RESPIRATION RATE: 18 BRPM | HEART RATE: 84 BPM

## 2021-02-09 LAB
ALBUMIN SERPL ELPH-MCNC: 3.9 G/DL — SIGNIFICANT CHANGE UP (ref 3.5–5.2)
ALP SERPL-CCNC: 39 U/L — SIGNIFICANT CHANGE UP (ref 30–115)
ALT FLD-CCNC: 10 U/L — SIGNIFICANT CHANGE UP (ref 0–41)
ANION GAP SERPL CALC-SCNC: 10 MMOL/L — SIGNIFICANT CHANGE UP (ref 7–14)
AST SERPL-CCNC: 17 U/L — SIGNIFICANT CHANGE UP (ref 0–41)
BASOPHILS # BLD AUTO: 0.12 K/UL — SIGNIFICANT CHANGE UP (ref 0–0.2)
BASOPHILS NFR BLD AUTO: 1.1 % — HIGH (ref 0–1)
BILIRUB SERPL-MCNC: 0.4 MG/DL — SIGNIFICANT CHANGE UP (ref 0.2–1.2)
BUN SERPL-MCNC: 23 MG/DL — HIGH (ref 10–20)
CALCIUM SERPL-MCNC: 9.7 MG/DL — SIGNIFICANT CHANGE UP (ref 8.5–10.1)
CHLORIDE SERPL-SCNC: 101 MMOL/L — SIGNIFICANT CHANGE UP (ref 98–110)
CO2 SERPL-SCNC: 25 MMOL/L — SIGNIFICANT CHANGE UP (ref 17–32)
CREAT SERPL-MCNC: 1.4 MG/DL — SIGNIFICANT CHANGE UP (ref 0.7–1.5)
EOSINOPHIL # BLD AUTO: 0.2 K/UL — SIGNIFICANT CHANGE UP (ref 0–0.7)
EOSINOPHIL NFR BLD AUTO: 1.8 % — SIGNIFICANT CHANGE UP (ref 0–8)
GLUCOSE SERPL-MCNC: 98 MG/DL — SIGNIFICANT CHANGE UP (ref 70–99)
HCT VFR BLD CALC: 34.6 % — LOW (ref 42–52)
HGB BLD-MCNC: 11.7 G/DL — LOW (ref 14–18)
IMM GRANULOCYTES NFR BLD AUTO: 1.6 % — HIGH (ref 0.1–0.3)
LYMPHOCYTES # BLD AUTO: 2.62 K/UL — SIGNIFICANT CHANGE UP (ref 1.2–3.4)
LYMPHOCYTES # BLD AUTO: 23.7 % — SIGNIFICANT CHANGE UP (ref 20.5–51.1)
MCHC RBC-ENTMCNC: 31 PG — SIGNIFICANT CHANGE UP (ref 27–31)
MCHC RBC-ENTMCNC: 33.8 G/DL — SIGNIFICANT CHANGE UP (ref 32–37)
MCV RBC AUTO: 91.5 FL — SIGNIFICANT CHANGE UP (ref 80–94)
MONOCYTES # BLD AUTO: 1.26 K/UL — HIGH (ref 0.1–0.6)
MONOCYTES NFR BLD AUTO: 11.4 % — HIGH (ref 1.7–9.3)
NEUTROPHILS # BLD AUTO: 6.68 K/UL — HIGH (ref 1.4–6.5)
NEUTROPHILS NFR BLD AUTO: 60.4 % — SIGNIFICANT CHANGE UP (ref 42.2–75.2)
NRBC # BLD: 0 /100 WBCS — SIGNIFICANT CHANGE UP (ref 0–0)
PLATELET # BLD AUTO: 224 K/UL — SIGNIFICANT CHANGE UP (ref 130–400)
POTASSIUM SERPL-MCNC: 5.1 MMOL/L — HIGH (ref 3.5–5)
POTASSIUM SERPL-SCNC: 5.1 MMOL/L — HIGH (ref 3.5–5)
PROT SERPL-MCNC: 6.8 G/DL — SIGNIFICANT CHANGE UP (ref 6–8)
RBC # BLD: 3.78 M/UL — LOW (ref 4.7–6.1)
RBC # FLD: 14.2 % — SIGNIFICANT CHANGE UP (ref 11.5–14.5)
SODIUM SERPL-SCNC: 136 MMOL/L — SIGNIFICANT CHANGE UP (ref 135–146)
WBC # BLD: 11.06 K/UL — HIGH (ref 4.8–10.8)
WBC # FLD AUTO: 11.06 K/UL — HIGH (ref 4.8–10.8)

## 2021-02-09 PROCEDURE — 99239 HOSP IP/OBS DSCHRG MGMT >30: CPT

## 2021-02-09 RX ORDER — METOPROLOL TARTRATE 50 MG
1 TABLET ORAL
Qty: 0 | Refills: 0 | DISCHARGE

## 2021-02-09 RX ORDER — FUROSEMIDE 40 MG
1 TABLET ORAL
Qty: 0 | Refills: 0 | DISCHARGE

## 2021-02-09 RX ORDER — OLMESARTAN MEDOXOMIL 5 MG/1
1 TABLET, FILM COATED ORAL
Qty: 0 | Refills: 0 | DISCHARGE

## 2021-02-09 RX ADMIN — HEPARIN SODIUM 5000 UNIT(S): 5000 INJECTION INTRAVENOUS; SUBCUTANEOUS at 13:35

## 2021-02-09 RX ADMIN — Medication 81 MILLIGRAM(S): at 06:35

## 2021-02-09 NOTE — PROGRESS NOTE ADULT - ASSESSMENT
80 yold male to ED Jamaican speaking Pmhx BPH s/p prostate sx, HTN,  HLD, (Patient describes Endarterectomy procedure on left), depression s/p near syncopal episode witnessed by family; pt at dinner with family admits to feeling lightheaded and gradually laid to floor; pt has 2nd episode few minutes later upon  ambulation. Patient  states that he did not experience  any  room spinning only felt lightheaded at the  onset of the episode.  Patient  denies chest pain, sob,n/v/headache, sob, fever, chills cough; pt denies hx irreg hr in past; admits to mild alcohol use tonight;     Elevated  WBC Chest X-Ray  showing right lobe opacity no prior films     Please Confirm rest  of medications in The AM: Apologize for the inconvenience   Family could not  remember all of patient medication  and Patient was also unable to remember  Vytorin  and Divalproex      Pharmacy: [facility] [address] [place] [address] Phone [phone]     Partial  Med Rec done           IMPRESSION  Presyncopal Episodes Likely Cardiogenic  Less likely  Autonomic dysfunction/ Peripheral Vertigo/Seizure/  Possible DOMENICO consult Progression on CKD Unknown  Baseline   HAGMA   Hyponatremia   Microcytic  Anemia   HO Endarterectomy? On left   HO HTN   HO BPH s/p Prostate Surg TURP?        Plan  Presyncopal Episodes Likely Cardiogenic  Less likely  Autonomic dysfunction/ Peripheral Vertigo   Tele monitoring  Echo, ef 60-65%  Carotid Doppler, less than 50% stenosis in b/l carotid artery  orthostatics  BP check, negative   ECG  showing sinus  cindy  that improved  1st degree AV Block   holding BB  cardio f/u then D/C home    Possible DOMENICO consult Progression on CKD Unknown  Baseline   HAGMA   Hyponatremia? initially 125, now improving 135  - f/u urine lytes osmo, serum osmo  - monitor BMP     HO Endarterectomy? On left  - carotid doppler, done, less than 50% stenosis in b/l carotid artery  -  continue  Aspirin 81 BID     HO HTN   - monitor BP   - Take losartan 25mg daily and metoprolol 25mg daily at home    HO BPH s/p Prostate Surg TURP?  - patient  takes  finasteride  and flowmax  - wears  adult diapers       Electrolyte Imbalances: Correct as needed  []  Hyponatremia   /   Hypernatremia  []   []  Hypokalemia   /   Hyperkalemia  []   []  Hypochlorhydria   /    Hypochlorhydria  []   []  Hypomagnesemia   /   Hypermagnesemia  []   []  Hypophosphatemia   /   Hyperphosphatemia  []       GI ppx:                                   [] Not indicated   /   [] Pantoprazole 40mg PO Daily    DVT ppx:  [] Not indicated / [x] Heparin 5000mg SubQ / [] Lovenox 40mg SubQ / [] SCDs    Fluids:   [x] PO  |  [] IVF    Activity:  [X] Assisatnce needed   [X] Increase as Tolerated  /  [] OOB w/ assist  /  [] Bed Rest    BMI:      DISPO:  Patient to be discharged when condition(s) optimized.  [x ] From Home     [ ] NH/SNF   [ ] 4A Rehab  [ ] Detox Clinic  [X] Plan Discussion with patient and/or family.  [X] Discussed Case and Plan with the Medical Attending.    CODE STATUS  [X] FULL   /    [] DNR

## 2021-02-09 NOTE — DISCHARGE NOTE PROVIDER - HOSPITAL COURSE
80 yold male to ED Swiss speaking Pmhx BPH s/p prostate sx, HTN,  HLD, (Patient describes Endarterectomy procedure on left), depression s/p near syncopal episode witnessed by family; pt at dinner with family admits to feeling lightheaded and gradually laid to floor; pt has 2nd episode few minutes later upon  ambulation. Patient  states that he did not experience  any  room spinning only felt lightheaded at the  onset of the episode.  Patient  denies chest pain, sob,n/v/headache, sob, fever, chills cough; pt denies hx irreg hr in past; admits to mild alcohol use tonight.  Found to have 1st degree AV heart block on admission EKG.     IMPRESSION  Presyncopal Episodes Likely Cardiogenic  Less likely  Autonomic dysfunction/ Peripheral Vertigo/Seizure/  Possible DOMENICO consult Progression on CKD Unknown  Baseline   HAGMA   Hyponatremia   Microcytic  Anemia   HO Endarterectomy? On left   HO HTN   HO BPH s/p Prostate Surg TURP?      Plan  Presyncopal Episodes Likely Cardiogenic  Less likely  Autonomic dysfunction/ Peripheral Vertigo   monitored on tele  Echo, ef 60-65%  Carotid Doppler, less than 50% stenosis in b/l carotid artery  orthostatics  BP check, negative   ECG  showing sinus  cindy  that improved  1st degree AV Block   holding BB  cardio f/u then D/C home    Possible DOMENICO consult Progression on CKD Unknown  Baseline   HAGMA   Hyponatremia? initially 125, now improving 135    HO Endarterectomy? On left  - carotid doppler, done, less than 50% stenosis in b/l carotid artery  -  continue  Aspirin 81 BID     HO HTN   - monitor BP   - Take losartan 25mg daily and metoprolol 25mg daily at home    HO BPH s/p Prostate Surg TURP?  - patient  takes  finasteride  and flowmax  - wears  adult diapers          80 yold male to ED Marshallese speaking Pmhx BPH s/p prostate sx, HTN,  HLD, (Patient describes Endarterectomy procedure on left), depression s/p near syncopal episode witnessed by family; pt at dinner with family admits to feeling lightheaded and gradually laid to floor; pt has 2nd episode few minutes later upon  ambulation. Patient  states that he did not experience  any  room spinning only felt lightheaded at the  onset of the episode.  Patient  denies chest pain, sob,n/v/headache, sob, fever, chills cough; pt denies hx irreg hr in past; admits to mild alcohol use tonight.  Found to have 1st degree AV heart block on admission EKG.     IMPRESSION  Presyncopal Episodes Likely Cardiogenic - pt on diuretics     HAGMA - resolved  Hyponatremia - resolved  Microcytic  Anemia   HO Endarterectomy? On left   HO HTN   HO BPH s/p Prostate Surg TURP?      Plan  Presyncopal Episodes Likely Cardiogenic  Less likely  Autonomic dysfunction/ Peripheral Vertigo   monitored on tele  Echo, ef 60-65%  Carotid Doppler, less than 50% stenosis in b/l carotid artery  orthostatics  BP check, negative   ECG  showing sinus  cindy  that improved  1st degree AV Block   holding BB  cardio consult appreciated: hold diuretic and outpt f/u    HO HTN   - monitor BP   - Takes losartan 25mg daily and metoprolol 25mg daily at home    HO BPH s/p Prostate Surg TURP?  - patient  takes  finasteride  and flomax

## 2021-02-09 NOTE — CONSULT NOTE ADULT - ASSESSMENT
Mr. Hutton is a 79 yo male with PMH of HTN, HLD, hx of right-sided carotid endarterectomy (~3yrs ago), and BPH s/p surgery (unspecified), who presented to the ED for a near-syncope episode witnessed by family. Cardio was consulted to evaluate for possible cardiac cause.    # Presyncope, first episode  - No clear cardiac cause based on imaging, labs, and history  - Pt currently stable, asymptomatic  - Trops negative x2, first EKG shows sinus bradycardia with possible 1st deg AV block, no ischemic changes  - Carotid doppler shows <50% stenosis bilaterally  - TTE shows normal EF, no structural abnormalities  - Consider ischemic workup inpatient vs outpatient - will discuss with cardiology team  - Determine if pt has a cardiologist to follow up with outpatient Mr. Hutton is a 81 yo male with PMH of HTN, HLD, hx of right-sided carotid endarterectomy (~3yrs ago), and BPH s/p surgery (unspecified), who presented to the ED for a near-syncope episode witnessed by family. Cardio was consulted to evaluate for possible cardiac cause.    # Presyncope, first episode  - No clear cardiac cause based on imaging, labs, and history  - Pt currently stable, asymptomatic  - Trops negative x2, first EKG shows sinus bradycardia with possible 1st deg AV block, no ischemic changes  - Carotid doppler shows <50% stenosis bilaterally  - TTE shows normal EF, no structural abnormalities  - Consider ischemic workup outpatient -   - hold diuretics for now. Will follow up hr as outpt as well and adrress BB as outpt.

## 2021-02-09 NOTE — CONSULT NOTE ADULT - SUBJECTIVE AND OBJECTIVE BOX
Outpt cardiologist:    HPI:  Russian  [id number]  80 yold male to ED Cook Islander speaking Pmhx BPH s/p prostate sx, HTN,  HLD, (Patient describes Endarterectomy procedure on left), depression s/p near syncopal episode witnessed by family; pt at dinner with family admits to feeling lightheaded and gradually laid to floor; pt has 2nd episode few minutes later upon  ambulation. Patient  states that he did not experience  any  room spinning only felt lightheaded at the  onset of the episode.  Patient  denies chest pain, sob,n/v/headache, sob, fever, chills cough; pt denies hx irreg hr in past; admits to mild alcohol use tonight;     Please Confirm rest  of medications in The AM Apologize for the inconvenience   Family could not  remember all of patient medication  and Patient was also unable to remember  Vytorin  and Divalproex      Pharmacy: [facility] [address] [place] [address] Phone [phone]     Partial  Med Rec done  (2021 23:26)      ---  cardio fellow additional notes:  Mr. Hutton is a 81 yo male with PMH of HTN, HLD, hx of right-sided carotid endarterectomy (~3yrs ago), and BPH s/p surgery (unspecified), who presented to the ED for a near-syncope episode witnessed by family. Cardio was consulted to evaluate for possible cardiac cause. Pt states that during the episode, he was with his son when he started to feel like he "was slowly going down" towards the floor with a feeling of "low power", but denies any spinning sensations, fever/chills, headache, visual changes, neck pain, chest pain, dyspnea, nausea, urinary or fecal incontinence, unilateral weakness, or numbness prior to or during the event. Pt denies experiencing any similar episodes in the past. Pt denies taking any new medications or recent illness. Pt denies any prior hx of MI, CVA, or cath. Pt endorses good medication compliance. Pt denies any issues with DOAN and endorses being able to walk >2 flights of stairs without SOB. Pt denies any smoking history, admits occasional alcohol use. Pt states that he has not seen a cardiologist for over 20 years.    PAST MEDICAL & SURGICAL HISTORY  History of BPH s/p transurethral prostatectomy  History of right-sided carotid endarterectomy  HTN (hypertension)  HLD (hyperlipidemia)      FAMILY HISTORY:  FAMILY HISTORY:      SOCIAL HISTORY:  Social History:  Denies Smoking   Admits to drinking occasionally   Denies Ilicit drug Use (2021 23:26)    ALLERGIES:  No Known Allergies    MEDICATIONS:  aspirin enteric coated 81 milliGRAM(s) Oral two times a day  chlorhexidine 4% Liquid 1 Application(s) Topical <User Schedule>  heparin   Injectable 5000 Unit(s) SubCutaneous every 8 hours  tamsulosin 0.8 milliGRAM(s) Oral at bedtime    PRN:      HOME MEDICATIONS:  Home Medications:  Aspir 81 oral delayed release tablet: 1 tab(s) orally 2 times a day (2021 23:39)  Benicar 20 mg oral tablet: 1 tab(s) orally once a day (2021 23:39)  fenofibrate 145 mg oral tablet: 1 tab(s) orally once a day (2021 13:50)  finasteride 1 mg oral tablet: 1 tab(s) orally once a day (2021 23:39)  Flomax 0.4 mg oral capsule: 2 cap(s) orally once a day (2021 23:39)  furosemide 20 mg oral tablet: 1 tab(s) orally once a day (2021 13:54)  losartan 25 mg oral tablet: 1 tab(s) orally once a day (2021 13:53)  Metoprolol Tartrate 25 mg oral tablet: 1 tab(s) orally 2 times a day (2021 23:39)  QUEtiapine 25 mg oral tablet: 1 tab(s) orally once a day (2021 13:46)  simvastatin 20 mg oral tablet: 1 tab(s) orally once a day (at bedtime) (2021 13:53)  Vascepa 1 g oral capsule: 1 tab(s) orally 2 times a day (2021 13:51)  Vitamin D3 50,000 intl units (1250 mcg) oral capsule: 1 tab(s) orally once a week (2021 13:52)  Zetia 10 mg oral tablet: 1 tab(s) orally once a day (2021 13:54)      VITALS:   T(F): 96.6 ( @ 05:43), Max: 97.6 ( @ 19:41)  HR: 62 ( @ 07:14) (59 - 80)  BP: 151/71 ( @ 07:14) (121/60 - 189/84)  BP(mean): --  RR: 18 ( @ 05:43) (18 - 19)  SpO2: 99% ( @ 08:31) (99% - 100%)    I&O's Summary    2021 07:  -  2021 07:00  --------------------------------------------------------  IN: 1255 mL / OUT: 1600 mL / NET: -345 mL    2021 07:  -  2021 12:05  --------------------------------------------------------  IN: 340 mL / OUT: 250 mL / NET: 90 mL        REVIEW OF SYSTEMS:  CONSTITUTIONAL: (+) weakness, (-) fevers or chills  HEENT: No visual changes, neck/ear pain  RESPIRATORY: No cough, sob  CARDIOVASCULAR: See HPI  GASTROINTESTINAL: No abdominal pain. No nausea, vomiting, diarrhea   GENITOURINARY: No dysuria, frequency or hematuria  NEUROLOGICAL: No new focal deficits  SKIN: No new rashes    PHYSICAL EXAM:  General: Not in distress.  Non-toxic appearing.  Well-nourished, well-developed. Non-obese.  Irritable.  HEENT: EOMI. No conjunctival pallor.  Cardio: Regular rate and rhythm, S1, S2, no murmur appreciated. Peripheral pulses 2+ in UE bilaterally, symmetrical. Capillary refill <2s.  Pulm: Breath sounds clear to auscultation bilaterally.  No wheezing / crackles  Abdomen: Soft, non-tender, non-distended.   Extremities: No edema b/l in LE. No skin discoloration or tenderness noted in legs.  Neuro: A&O x3. No focal deficits.    LABS:                        11.7   11.06 )-----------( 224      ( 2021 07:21 )             34.6     02    136  |  101  |  23<H>  ----------------------------<  98  5.1<H>   |  25  |  1.4    Ca    9.7      2021 07:21  Mg     2.0     02-08    TPro  6.8  /  Alb  3.9  /  TBili  0.4  /  DBili  x   /  AST  17  /  ALT  10  /  AlkPhos  39  02-09    CARDIAC MARKERS ( 2021 07:15 )  x     / <0.01 ng/mL / x     / x     / x      CARDIAC MARKERS ( 2021 19:55 )  x     / <0.01 ng/mL / x     / x     / x        Serum Pro-Brain Natriuretic Peptide: 421 pg/mL (21 @ 19:55)    SARS-CoV-2: NotDetec (2021 19:55)      RADIOLOGY:  - CXR: No active cardiopulm disease  - TTE: LVEF 60-65%, mild AR/MR/TR, normal structure  - Carotid duplex: <50% stenosis in both right internal and left internal carotid arteries    EC Lead ECG:   Ventricular Rate 63 BPM  Atrial Rate 63 BPM  P-R Interval 202 ms  QRS Duration 92 ms  Q-T Interval 414 ms  QTC Calculation(Bazett) 423 ms  P Axis 41 degrees  R Axis 16 degrees  T Axis 31 degrees    Diagnosis Line Normal sinus rhythm  Minimal voltage criteria for LVH, may be normal variant  Borderline ECG    Confirmed by Irma Guzman MD ([id number]) on 2021 8:39:25 AM ( @ 07:39)

## 2021-02-09 NOTE — DISCHARGE NOTE PROVIDER - NSDCMRMEDTOKEN_GEN_ALL_CORE_FT
Aspir 81 oral delayed release tablet: 1 tab(s) orally 2 times a day  Benicar 20 mg oral tablet: 1 tab(s) orally once a day  fenofibrate 145 mg oral tablet: 1 tab(s) orally once a day  finasteride 1 mg oral tablet: 1 tab(s) orally once a day  Flomax 0.4 mg oral capsule: 2 cap(s) orally once a day  furosemide 20 mg oral tablet: 1 tab(s) orally once a day  losartan 25 mg oral tablet: 1 tab(s) orally once a day  Metoprolol Tartrate 25 mg oral tablet: 1 tab(s) orally 2 times a day  QUEtiapine 25 mg oral tablet: 1 tab(s) orally once a day  simvastatin 20 mg oral tablet: 1 tab(s) orally once a day (at bedtime)  Vascepa 1 g oral capsule: 1 tab(s) orally 2 times a day  Vitamin D3 50,000 intl units (1250 mcg) oral capsule: 1 tab(s) orally once a week  Zetia 10 mg oral tablet: 1 tab(s) orally once a day   Aspir 81 oral delayed release tablet: 1 tab(s) orally 2 times a day  Benicar 20 mg oral tablet: 1 tab(s) orally once a day  fenofibrate 145 mg oral tablet: 1 tab(s) orally once a day  finasteride 1 mg oral tablet: 1 tab(s) orally once a day  Flomax 0.4 mg oral capsule: 2 cap(s) orally once a day  losartan 25 mg oral tablet: 1 tab(s) orally once a day  QUEtiapine 25 mg oral tablet: 1 tab(s) orally once a day  simvastatin 20 mg oral tablet: 1 tab(s) orally once a day (at bedtime)  Vascepa 1 g oral capsule: 1 tab(s) orally 2 times a day  Vitamin D3 50,000 intl units (1250 mcg) oral capsule: 1 tab(s) orally once a week  Zetia 10 mg oral tablet: 1 tab(s) orally once a day   Aspir 81 oral delayed release tablet: 1 tab(s) orally 2 times a day  fenofibrate 145 mg oral tablet: 1 tab(s) orally once a day  finasteride 1 mg oral tablet: 1 tab(s) orally once a day  Flomax 0.4 mg oral capsule: 2 cap(s) orally once a day  losartan 25 mg oral tablet: 1 tab(s) orally once a day  QUEtiapine 25 mg oral tablet: 1 tab(s) orally once a day  simvastatin 20 mg oral tablet: 1 tab(s) orally once a day (at bedtime)  Vascepa 1 g oral capsule: 1 tab(s) orally 2 times a day  Vitamin D3 50,000 intl units (1250 mcg) oral capsule: 1 tab(s) orally once a week  Zetia 10 mg oral tablet: 1 tab(s) orally once a day

## 2021-02-09 NOTE — DISCHARGE NOTE PROVIDER - CARE PROVIDER_API CALL
ALEC ASKEW  [id number]  [address]  [place] [address]  Phone: ()-  Fax: ()-  Follow Up Time: 2 weeks

## 2021-02-09 NOTE — DISCHARGE NOTE PROVIDER - NSDCCPCAREPLAN_GEN_ALL_CORE_FT
PRINCIPAL DISCHARGE DIAGNOSIS  Diagnosis: Near syncope  Assessment and Plan of Treatment: When you presented, we worked up the cause of your lightheadedness. We did not see any acute coronary injury. There were slowing of the heart seen on ekg. Hemodynamically you did not show any orthostatic changes that could have caused this.   We recommend follow up with you primary care physician upon discharge.       PRINCIPAL DISCHARGE DIAGNOSIS  Diagnosis: Near syncope  Assessment and Plan of Treatment: When you presented, we worked up the cause of your lightheadedness. We did not see any acute coronary injury. There were slowing of the heart seen on ekg. Hemodynamically you did not show any orthostatic changes that could have caused this.   We recommend follow up with you primary care physician upon discharge.  Discontinue the metoprolol and furosemide for now.

## 2021-02-09 NOTE — PROGRESS NOTE ADULT - SUBJECTIVE AND OBJECTIVE BOX
CAITLIN KEMP  80y Male    INTERVAL HPI/OVERNIGHT EVENTS:    Pt anxiously waiting for cardiology consult.  spoke to pt via Malian  #[id number]  He denies chest pain, SOB, dizziness.  Tele reviewed - ST and ?episodes of SVT    T(F): 96.6 (02-09-21 @ 05:43), Max: 97.2 (02-08-21 @ 13:39)  HR: 62 (02-09-21 @ 07:14) (62 - 80)  BP: 151/71 (02-09-21 @ 07:14) (121/60 - 189/84)  RR: 18 (02-09-21 @ 05:43) (18 - 19)  SpO2: 99% (02-09-21 @ 08:31) (99% - 99%) on RA    I&O's Summary    08 Feb 2021 07:01  -  09 Feb 2021 07:00  --------------------------------------------------------  IN: 1255 mL / OUT: 1600 mL / NET: -345 mL    09 Feb 2021 07:01  -  09 Feb 2021 12:35  --------------------------------------------------------  IN: 340 mL / OUT: 250 mL / NET: 90 mL      PHYSICAL EXAM:  GENERAL: NAD  HEAD:  Normocephalic  EYES:  conjunctiva and sclera clear  ENMT: Moist mucous membranes  NECK: Supple, No JVD  NERVOUS SYSTEM:  Alert, awake, Good concentration  CHEST/LUNG: Clear to percussion bilaterally  HEART: Regular rate and rhythm; No murmurs  ABDOMEN: Soft, Nontender, Nondistended; Bowel sounds present  EXTREMITIES:  No edema      Consultant(s) Notes Reviewed:  [x ] YES  [ ] NO  Care Discussed with Consultants/Other Providers [ x] YES  [ ] NO    MEDICATIONS  (STANDING):  aspirin enteric coated 81 milliGRAM(s) Oral two times a day  chlorhexidine 4% Liquid 1 Application(s) Topical <User Schedule>  heparin   Injectable 5000 Unit(s) SubCutaneous every 8 hours  tamsulosin 0.8 milliGRAM(s) Oral at bedtime    MEDICATIONS  (PRN):    EKG reviewed  Telemetry reviewed    LABS:                        11.7   11.06 )-----------( 224      ( 09 Feb 2021 07:21 )             34.6     02-09    136  |  101  |  23<H>  ----------------------------<  98  5.1<H>   |  25  |  1.4    Ca    9.7      09 Feb 2021 07:21  Mg     2.0     02-08    TPro  6.8  /  Alb  3.9  /  TBili  0.4  /  DBili  x   /  AST  17  /  ALT  10  /  AlkPhos  39  02-09      CARDIAC MARKERS ( 08 Feb 2021 07:15 )  x     / <0.01 ng/mL / x     / x     / x      CARDIAC MARKERS ( 07 Feb 2021 19:55 )  x     / <0.01 ng/mL / x     / x     / x              RADIOLOGY & ADDITIONAL TESTS:    Imaging or report Personally Reviewed:  [x ] YES  [ ] NO    < from: TTE Echo Complete w/o Contrast w/ Doppler (02.08.21 @ 09:52) >  Summary:   1. Left ventricular ejection fraction, by visual estimation, is 60 to 65%.   2. Normal left ventricular size and wall thicknesses, with normal systolic and diastolic function.   3. Mild aortic regurgitation.   4. Mild mitral regurgitation.   5. Mild tricuspid regurgitation.    < end of copied text >  < from: VA Duplex Carotid, Bilat (02.08.21 @ 08:53) >  Impression:    Less than 50% stenosis of the right internal carotid artery.  Less than 50% stenosis of the left internal carotid artery.    < end of copied text >  < from: 12 Lead ECG (02.08.21 @ 07:39) >  Diagnosis Line Normal sinus rhythm  Minimal voltage criteria for LVH, may be normal variant  Borderline ECG    < end of copied text >      Case discussed with resident    Care discussed with pt      As per pharmacy: confirmed by resident  quetiapine 25mg po qd  sodium valproate 50mg po qd   Vit D 5000 units po qd  Vascepa 1g po qd  Fenofibrate 145mg po qd  losartan 20mg po qd  metoprolol 25mg po qd  simvastatin 20mg po qd  zetia 10mg po qd  furosemide 20mg po qd  aspirin enteric coated 81 milliGRAM(s) Oral two times a day  tamsulosin 0.8 milliGRAM(s) Oral at bedtime

## 2021-02-09 NOTE — PROGRESS NOTE ADULT - SUBJECTIVE AND OBJECTIVE BOX
CAITLIN KEMP 80y Male  MRN#: [id number]   CODE STATUS:________      SUBJECTIVE  Patient is a 80y old Male who presents with a chief complaint of Currently admitted to medicine with the primary diagnosis of Near syncope      Today is hospital day 2d, and this morning he is           OBJECTIVE  PAST MEDICAL & SURGICAL HISTORY  History of BPH    H/O endarterectomy    HTN (hypertension)    History of transurethral prostatectomy      ALLERGIES:  No Known Allergies    MEDICATIONS:  STANDING MEDICATIONS  aspirin enteric coated 81 milliGRAM(s) Oral two times a day  chlorhexidine 4% Liquid 1 Application(s) Topical <User Schedule>  heparin   Injectable 5000 Unit(s) SubCutaneous every 8 hours  tamsulosin 0.8 milliGRAM(s) Oral at bedtime    PRN MEDICATIONS      VITAL SIGNS: Last 24 Hours  T(C): 35.9 (09 Feb 2021 05:43), Max: 36.2 (08 Feb 2021 13:39)  T(F): 96.6 (09 Feb 2021 05:43), Max: 97.2 (08 Feb 2021 13:39)  HR: 62 (09 Feb 2021 07:14) (62 - 80)  BP: 151/71 (09 Feb 2021 07:14) (121/60 - 189/84)  BP(mean): --  RR: 18 (09 Feb 2021 05:43) (18 - 19)  SpO2: 99% (09 Feb 2021 08:31) (99% - 99%)    LABS:                        11.7   11.06 )-----------( 224      ( 09 Feb 2021 07:21 )             34.6     02-09    136  |  101  |  23<H>  ----------------------------<  98  5.1<H>   |  25  |  1.4    Ca    9.7      09 Feb 2021 07:21  Mg     2.0     02-08    TPro  6.8  /  Alb  3.9  /  TBili  0.4  /  DBili  x   /  AST  17  /  ALT  10  /  AlkPhos  39  02-09              CARDIAC MARKERS ( 08 Feb 2021 07:15 )  x     / <0.01 ng/mL / x     / x     / x      CARDIAC MARKERS ( 07 Feb 2021 19:55 )  x     / <0.01 ng/mL / x     / x     / x          RADIOLOGY:      PHYSICAL EXAM:    GENERAL: NAD, well-developed, AAOx3  HEENT:  Atraumatic, Normocephalic. EOMI, PERRLA, conjunctiva and sclera clear, No JVD  PULMONARY: Clear to auscultation bilaterally; No wheeze  CARDIOVASCULAR: Regular rate and rhythm; No murmurs, rubs, or gallops  GASTROINTESTINAL: Soft, Nontender, Nondistended; Bowel sounds present  MUSCULOSKELETAL:  2+ Peripheral Pulses, No clubbing, cyanosis, or edema  NEUROLOGY: non-focal  SKIN: No rashes or lesions       CAITLIN KEMP 80y Male  MRN#: [id number]   CODE STATUS:________      SUBJECTIVE  HPI: "Citizen of [place]  [id number]  80 yold male to ED Tajik speaking Pmhx BPH s/p prostate sx, HTN,  HLD, (Patient describes Endarterectomy procedure on left), depression s/p near syncopal episode witnessed by family; pt at dinner with family admits to feeling lightheaded and gradually laid to floor; pt has 2nd episode few minutes later upon  ambulation. Patient  states that he did not experience  any  room spinning only felt lightheaded at the  onset of the episode.  Patient  denies chest pain, sob,n/v/headache, sob, fever, chills cough; pt denies hx irreg hr in past; admits to mild alcohol use tonight"  Found to have 1st degree AV heart block on admission EKG.     Patient s/p carotid duplex yday showing less than 50% stenosis in b/l carotids. Echo yday wnl besides mild MR/TR/AR. Patient seen at bedside this morning, found sitting up in bed eating breakfast. Reports feeling well with no complaints. Anticipated discharge today pending cardio eval. No acute events overnight.      OBJECTIVE  PAST MEDICAL & SURGICAL HISTORY  History of BPH    H/O endarterectomy    HTN (hypertension)    History of transurethral prostatectomy      ALLERGIES:  No Known Allergies    MEDICATIONS:  STANDING MEDICATIONS  aspirin enteric coated 81 milliGRAM(s) Oral two times a day  chlorhexidine 4% Liquid 1 Application(s) Topical <User Schedule>  heparin   Injectable 5000 Unit(s) SubCutaneous every 8 hours  tamsulosin 0.8 milliGRAM(s) Oral at bedtime    PRN MEDICATIONS      VITAL SIGNS: Last 24 Hours  T(C): 35.9 (09 Feb 2021 05:43), Max: 36.2 (08 Feb 2021 13:39)  T(F): 96.6 (09 Feb 2021 05:43), Max: 97.2 (08 Feb 2021 13:39)  HR: 62 (09 Feb 2021 07:14) (62 - 80)  BP: 151/71 (09 Feb 2021 07:14) (121/60 - 189/84)  BP(mean): --  RR: 18 (09 Feb 2021 05:43) (18 - 19)  SpO2: 99% (09 Feb 2021 08:31) (99% - 99%)    LABS:                        11.7   11.06 )-----------( 224      ( 09 Feb 2021 07:21 )             34.6     02-09    136  |  101  |  23<H>  ----------------------------<  98  5.1<H>   |  25  |  1.4    Ca    9.7      09 Feb 2021 07:21  Mg     2.0     02-08    TPro  6.8  /  Alb  3.9  /  TBili  0.4  /  DBili  x   /  AST  17  /  ALT  10  /  AlkPhos  39  02-09              CARDIAC MARKERS ( 08 Feb 2021 07:15 )  x     / <0.01 ng/mL / x     / x     / x      CARDIAC MARKERS ( 07 Feb 2021 19:55 )  x     / <0.01 ng/mL / x     / x     / x          RADIOLOGY:  EXAM:  CAROTID DUPLEX COMPLETE BILAT          PROCEDURE DATE:  02/08/2021      The patient is 80-year-old male with dizziness.  The study was performed to evaluate the patient for carotid artery stenosis.    Duplex evaluation of the carotid arteries was performed bilaterally.  In the right carotid system, the internal carotid artery is noted to be patent with a peak systolic velocity of 118 cm/sec over end diastolic velocity of 21 cm/sec.    In the left carotid system, the internal carotid artery is noted to be patent with a peak systolic velocity of 120 cm/sec over end diastolic velocity of 21 cm/sec.    The right vertebral arterial flow was antegrade.  The left vertebral arterial flow was antegrade.    Impression:    Less than 50% stenosis of the right internal carotid artery.  Less than 50% stenosis of the left internal carotid artery.    TTE Echo w/o contrast 2/8/21  Summary:   1. Left ventricular ejection fraction, by visual estimation, is 60 to 65%.   2. Normal left ventricular size and wall thicknesses, with normal systolic and diastolic function.   3. Mild aortic regurgitation.   4. Mild mitral regurgitation.   5. Mild tricuspid regurgitation.    PHYSICAL EXAM:    GENERAL: NAD, well-developed, AAOx3  HEENT:  Atraumatic, Normocephalic. EOMI, PERRLA, conjunctiva and sclera clear, No JVD  PULMONARY: Clear to auscultation bilaterally; No wheeze  CARDIOVASCULAR: Regular rate and rhythm; No murmurs, rubs, or gallops  GASTROINTESTINAL: Soft, Nontender, Nondistended; Bowel sounds present  MUSCULOSKELETAL:  2+ Peripheral Pulses, No clubbing, cyanosis, or edema  NEUROLOGY: non-focal  SKIN: No rashes or lesions

## 2021-02-09 NOTE — DISCHARGE NOTE NURSING/CASE MANAGEMENT/SOCIAL WORK - PATIENT PORTAL LINK FT
You can access the FollowMyHealth Patient Portal offered by [facility] by registering at the following website: [email or web address]. By joining [facility]’s FollowMyHealth portal, you will also be able to view your health information using other applications (apps) compatible with our system.

## 2021-02-09 NOTE — PROGRESS NOTE ADULT - ASSESSMENT
79 y/o Macanese speaking man (understands some English) with PMH of BPH s/p prostate surgery, HTN, hyperlipidemia (Patient describes Endarterectomy procedure on left) and depression presented to the ED s/p near syncopal episode witnessed by family. NO LOC or fall. Patient denied chest pain, sob, n/v/headache, fever, chills cough.    1. Near syncope  no recurrent episodes this admission  cardio eval in progress - ?tachyarrhythmia on tele  troponins negative  ECHO with normal EF and no significant valve pathology  negative orthostatics  if no further inpt workup by cardio, then pt may go home and f/u with PMD who is setting pt up with cardiologist as outpt    2. HTN - resume metoprolol and losartan    3. Hyperlipidemia/dyslipidemia - on simvastatin, fenofibrate, zetia and fish oil    4. BPH on tamsulosin    5. Suspected vit D deficiency - on supplement    6. OOB and ambulate    7. CKD 3 - stable      Case discussed with PMD Dr. Reese [phone] who is in agreement with the above plan of care    Will review medication reconciliation and discharge papers when completed    Spent 45 minutes on the discharge            81 y/o Macanese speaking man (understands some English) with PMH of BPH s/p prostate surgery, HTN, hyperlipidemia (Patient describes Endarterectomy procedure on left) and depression presented to the ED s/p near syncopal episode witnessed by family. NO LOC or fall. Patient denied chest pain, sob, n/v/headache, fever, chills cough.    1. Near syncope  no recurrent episodes this admission  cardio eval in progress - ?tachyarrhythmia on tele  troponins negative  ECHO with normal EF and no significant valve pathology  negative orthostatics  if no further inpt workup by cardio, then pt may go home and f/u with PMD who is setting pt up with cardiologist as outpt    2. HTN - on losartan    3. Hyperlipidemia/dyslipidemia - on simvastatin, fenofibrate, zetia and fish oil    4. BPH on tamsulosin    5. Suspected vit D deficiency - on supplement    6. OOB and ambulate    7. CKD 3 - stable      Case discussed with PMD Dr. Reese [phone] who is in agreement with the above plan of care    Cardio consult reviewed - hold diuretic and re-evaluate Bblocker as outpt    Reviewed and corrected medication reconciliation and discharge papers  Spent 45 minutes on the discharge

## 2021-02-15 DIAGNOSIS — N40.0 BENIGN PROSTATIC HYPERPLASIA WITHOUT LOWER URINARY TRACT SYMPTOMS: ICD-10-CM

## 2021-02-15 DIAGNOSIS — I12.9 HYPERTENSIVE CHRONIC KIDNEY DISEASE WITH STAGE 1 THROUGH STAGE 4 CHRONIC KIDNEY DISEASE, OR UNSPECIFIED CHRONIC KIDNEY DISEASE: ICD-10-CM
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
## 2021-02-15 DIAGNOSIS — E87.1 HYPO-OSMOLALITY AND HYPONATREMIA: ICD-10-CM

## 2021-02-15 DIAGNOSIS — I35.1 NONRHEUMATIC AORTIC (VALVE) INSUFFICIENCY: ICD-10-CM

## 2021-02-15 DIAGNOSIS — R55 SYNCOPE AND COLLAPSE: ICD-10-CM

## 2021-02-15 DIAGNOSIS — E87.2 ACIDOSIS: ICD-10-CM

## 2021-02-15 DIAGNOSIS — D50.9 IRON DEFICIENCY ANEMIA, UNSPECIFIED: ICD-10-CM

## 2021-02-15 DIAGNOSIS — N18.30 CHRONIC KIDNEY DISEASE, STAGE 3 UNSPECIFIED: ICD-10-CM

## 2021-02-15 DIAGNOSIS — I34.0 NONRHEUMATIC MITRAL (VALVE) INSUFFICIENCY: ICD-10-CM

## 2021-02-15 DIAGNOSIS — E78.5 HYPERLIPIDEMIA, UNSPECIFIED: ICD-10-CM

## 2021-02-15 DIAGNOSIS — I44.0 ATRIOVENTRICULAR BLOCK, FIRST DEGREE: ICD-10-CM

## 2021-02-15 DIAGNOSIS — R00.1 BRADYCARDIA, UNSPECIFIED: ICD-10-CM

## 2021-02-15 DIAGNOSIS — D72.829 ELEVATED WHITE BLOOD CELL COUNT, UNSPECIFIED: ICD-10-CM

## 2021-02-15 DIAGNOSIS — Z86.79 PERSONAL HISTORY OF OTHER DISEASES OF THE CIRCULATORY SYSTEM: ICD-10-CM

## 2021-02-15 DIAGNOSIS — Z90.79 ACQUIRED ABSENCE OF OTHER GENITAL ORGAN(S): ICD-10-CM

## 2021-02-15 DIAGNOSIS — F10.10 ALCOHOL ABUSE, UNCOMPLICATED: ICD-10-CM

## 2021-02-15 DIAGNOSIS — F32.9 MAJOR DEPRESSIVE DISORDER, SINGLE EPISODE, UNSPECIFIED: ICD-10-CM

## 2021-02-15 DIAGNOSIS — I07.1 RHEUMATIC TRICUSPID INSUFFICIENCY: ICD-10-CM
